# Patient Record
Sex: MALE | Race: WHITE | NOT HISPANIC OR LATINO | Employment: FULL TIME | RURAL
[De-identification: names, ages, dates, MRNs, and addresses within clinical notes are randomized per-mention and may not be internally consistent; named-entity substitution may affect disease eponyms.]

---

## 2017-01-02 ENCOUNTER — HOSPITAL ENCOUNTER (OUTPATIENT)
Dept: PHYSICAL THERAPY | Facility: HOSPITAL | Age: 27
Setting detail: RECURRING SERIES
Discharge: HOME | End: 2017-02-17
Attending: NEUROLOGICAL SURGERY | Admitting: NEUROLOGICAL SURGERY

## 2020-05-28 ENCOUNTER — OFFICE VISIT (OUTPATIENT)
Dept: FAMILY MEDICINE CLINIC | Facility: CLINIC | Age: 30
End: 2020-05-28

## 2020-05-28 VITALS
HEIGHT: 75 IN | OXYGEN SATURATION: 97 % | BODY MASS INDEX: 39.17 KG/M2 | RESPIRATION RATE: 18 BRPM | WEIGHT: 315 LBS | DIASTOLIC BLOOD PRESSURE: 83 MMHG | SYSTOLIC BLOOD PRESSURE: 130 MMHG | HEART RATE: 64 BPM | TEMPERATURE: 97.6 F

## 2020-05-28 DIAGNOSIS — R53.83 LETHARGY: ICD-10-CM

## 2020-05-28 DIAGNOSIS — B07.0 PLANTAR WART OF BOTH FEET: Primary | ICD-10-CM

## 2020-05-28 DIAGNOSIS — R06.83 SNORING: ICD-10-CM

## 2020-05-28 DIAGNOSIS — G47.9 SLEEP DISORDER: ICD-10-CM

## 2020-05-28 DIAGNOSIS — M77.11 LATERAL EPICONDYLITIS OF BOTH ELBOWS: ICD-10-CM

## 2020-05-28 DIAGNOSIS — E78.2 MIXED HYPERLIPIDEMIA: ICD-10-CM

## 2020-05-28 DIAGNOSIS — R74.8 ABNORMAL LIVER ENZYMES: ICD-10-CM

## 2020-05-28 DIAGNOSIS — M77.12 LATERAL EPICONDYLITIS OF BOTH ELBOWS: ICD-10-CM

## 2020-05-28 DIAGNOSIS — F43.9 SITUATIONAL STRESS: ICD-10-CM

## 2020-05-28 PROBLEM — R79.89 ELEVATED SERUM CREATININE: Status: ACTIVE | Noted: 2020-05-28

## 2020-05-28 PROCEDURE — 99214 OFFICE O/P EST MOD 30 MIN: CPT | Performed by: FAMILY MEDICINE

## 2020-05-28 NOTE — ASSESSMENT & PLAN NOTE
New dx.  1 of the Right distal 1st metatarsal phalangeal head.  1 of the left 1st metatarsal phalangeal head.  Discussed different treatments and referral to dermatologist.  Pt. Declines and prefers to give it some time.

## 2020-05-28 NOTE — PROGRESS NOTES
Subjective   Bobby Rodriguez is a 29 y.o. male.     Pt. Is having difficulty with sleeping he stated that he goes to bed at 11:00 pm, pt. Wakes up every hour to every hour and a half.  Pt. Stated that he does snores and is very tired during the day, he thinks he possibly has sleep apnea    Arm Pain    The incident occurred more than 1 week ago. There was no injury mechanism. The quality of the pain is described as aching. The pain radiates to the left arm and right arm (elbows). The pain is at a severity of 5/10. The pain is moderate. The pain has been constant since the incident. The symptoms are aggravated by movement and lifting. He has tried nothing for the symptoms. The treatment provided no relief.   Skin Problem   This is a new problem. The current episode started more than 1 month ago. The problem occurs constantly. The problem has been gradually worsening. Associated symptoms include arthralgias. Nothing aggravates the symptoms. He has tried nothing for the symptoms. The treatment provided no relief.        The following portions of the patient's history were reviewed and updated as appropriate: allergies, current medications, past family history, past medical history, past social history, past surgical history and problem list.    History reviewed. No pertinent family history.    Social History     Tobacco Use   • Smoking status: Never Smoker   • Smokeless tobacco: Current User     Types: Chew   Substance Use Topics   • Alcohol use: Yes     Frequency: 2-3 times a week     Drinks per session: 10 or more   • Drug use: Never       History reviewed. No pertinent surgical history.    Patient Active Problem List   Diagnosis   • Plantar wart of both feet   • Lateral epicondylitis of both elbows   • Snoring   • Situational stress   • Sleep disorder   • Mixed hyperlipidemia   • Lethargy   • Elevated serum creatinine   • Abnormal liver enzymes       No current outpatient medications on file prior to visit.     No  "current facility-administered medications on file prior to visit.        No Known Allergies    Review of Systems   Musculoskeletal: Positive for arthralgias.   Skin: Positive for skin lesions.   Psychiatric/Behavioral: Positive for sleep disturbance.       Objective   Visit Vitals  /83 (BP Location: Right arm, Patient Position: Sitting, Cuff Size: Large Adult)   Pulse 64   Temp 97.6 °F (36.4 °C) (Oral)   Resp 18   Ht 190.5 cm (75\")   Wt (!) 160 kg (352 lb 12.8 oz)   SpO2 97%   BMI 44.10 kg/m²     Physical Exam   Constitutional: He is oriented to person, place, and time. He appears well-developed and well-nourished. He is cooperative.   HENT:   Head: Normocephalic.   Neck: Trachea normal. Neck supple. Carotid bruit is not present. No thyromegaly present.   Cardiovascular: Normal rate and regular rhythm. Exam reveals no gallop and no friction rub.   No murmur heard.  Pulmonary/Chest: Effort normal and breath sounds normal. No respiratory distress. He has no wheezes. He exhibits no tenderness.   Musculoskeletal:        Left forearm: He exhibits tenderness ( lateral epicondyles bilaterally).        Neurological: He is alert and oriented to person, place, and time.   Skin: Skin is dry. No rash noted. Nails show no clubbing.   Nursing note and vitals reviewed.        Assessment/Plan .  Problem List Items Addressed This Visit        Medium    Abnormal liver enzymes    Overview     Improving, not at goal Labs done 8-4-16, read by me, reviewed with Pt on last visit.  He has decreased ETOH since then         Relevant Orders    Comprehensive metabolic panel    Mixed hyperlipidemia    Overview     Labs ordered for October         Relevant Orders    Lipid Panel w/ Chol/HDL Ratio       Low    Lateral epicondylitis of both elbows    Current Assessment & Plan     New dx.  Pt. Advised to try a lasta strap and rice exercises.            Unprioritized    Lethargy    Overview     Worsening-Scheduled appt for labs and a follow " up.         Current Assessment & Plan     He has been doing cross training which helps         Relevant Orders    CBC w AUTO Differential    TSH    Plantar wart of both feet - Primary    Current Assessment & Plan     New dx.  1 of the Right distal 1st metatarsal phalangeal head.  1 of the left 1st metatarsal phalangeal head.  Discussed different treatments and referral to dermatologist.  Pt. Declines and prefers to give it some time.         Situational stress    Current Assessment & Plan     Slightly worse.  Discussed Yoga, exercise and counseling to try different ways to relax.         Sleep disorder    Current Assessment & Plan     Worse.  Advised to follow with sleep doctor 2nd to sleep walking and possible restless legs.         Snoring

## 2020-06-03 ENCOUNTER — OFFICE VISIT (OUTPATIENT)
Dept: NEUROLOGY | Facility: CLINIC | Age: 30
End: 2020-06-03

## 2020-06-03 VITALS
BODY MASS INDEX: 39.17 KG/M2 | HEIGHT: 75 IN | WEIGHT: 315 LBS | HEART RATE: 59 BPM | TEMPERATURE: 98.2 F | DIASTOLIC BLOOD PRESSURE: 82 MMHG | SYSTOLIC BLOOD PRESSURE: 123 MMHG

## 2020-06-03 DIAGNOSIS — E66.01 MORBID OBESITY (HCC): ICD-10-CM

## 2020-06-03 DIAGNOSIS — G47.33 OBSTRUCTIVE SLEEP APNEA: Primary | ICD-10-CM

## 2020-06-03 DIAGNOSIS — F51.3 SLEEP WALKING: ICD-10-CM

## 2020-06-03 PROBLEM — IMO0002 THORACIC OR LUMBOSACRAL NEURITIS OR RADICULITIS: Status: ACTIVE | Noted: 2020-06-03

## 2020-06-03 PROBLEM — F41.9 ANXIETY: Status: ACTIVE | Noted: 2020-06-03

## 2020-06-03 PROBLEM — D75.1 POLYCYTHEMIA: Status: ACTIVE | Noted: 2020-06-03

## 2020-06-03 PROBLEM — H66.92 LEFT OTITIS MEDIA: Status: ACTIVE | Noted: 2020-06-03

## 2020-06-03 PROBLEM — J02.9 ACUTE PHARYNGITIS: Status: ACTIVE | Noted: 2020-06-03

## 2020-06-03 PROBLEM — M25.511: Status: ACTIVE | Noted: 2020-06-03

## 2020-06-03 PROBLEM — E66.3 OVERWEIGHT (BMI 25.0-29.9): Status: ACTIVE | Noted: 2020-06-03

## 2020-06-03 PROBLEM — M79.604 RIGHT LEG PAIN: Status: ACTIVE | Noted: 2020-06-03

## 2020-06-03 PROBLEM — M51.26 DISPLACEMENT OF LUMBAR INTERVERTEBRAL DISC WITHOUT MYELOPATHY: Status: ACTIVE | Noted: 2020-06-03

## 2020-06-03 PROBLEM — S86.912A STRAIN OF LEFT KNEE: Status: ACTIVE | Noted: 2020-06-03

## 2020-06-03 PROBLEM — M54.50 ACUTE LOW BACK PAIN WITHOUT SCIATICA: Status: ACTIVE | Noted: 2020-06-03

## 2020-06-03 PROBLEM — J06.9 ACUTE UPPER RESPIRATORY INFECTION: Status: ACTIVE | Noted: 2020-06-03

## 2020-06-03 PROBLEM — Z13.29 SCREENING FOR HYPOTHYROIDISM: Status: ACTIVE | Noted: 2020-06-03

## 2020-06-03 PROBLEM — M62.830 SPASM OF LUMBAR PARASPINOUS MUSCLE: Status: ACTIVE | Noted: 2020-06-03

## 2020-06-03 PROBLEM — R19.7 DIARRHEA: Status: ACTIVE | Noted: 2020-06-03

## 2020-06-03 PROBLEM — M51.46: Status: ACTIVE | Noted: 2020-06-03

## 2020-06-03 PROCEDURE — 99244 OFF/OP CNSLTJ NEW/EST MOD 40: CPT | Performed by: PSYCHIATRY & NEUROLOGY

## 2020-06-03 RX ORDER — CYCLOBENZAPRINE HCL 10 MG
TABLET ORAL 3 TIMES DAILY
COMMUNITY
Start: 2019-03-04 | End: 2021-03-01 | Stop reason: SDUPTHER

## 2020-06-03 RX ORDER — IBUPROFEN 800 MG/1
TABLET ORAL
COMMUNITY
Start: 2019-03-04 | End: 2021-04-29

## 2020-06-03 NOTE — PROGRESS NOTES
Sleep Medicine initial Consultation    Bobby Rodriguez  : 1990  29 y.o. male   Date of Service: 6/3/2020  Referring provider: Genaro Nielsen MD    New sleep, neck measures 21 1/2 inches      History Of Present Illness:   Mr. Bobby Rodriguez  is a 29 y.o. right handed  male patient has a H/O NONE is here for the evaluation of Snoring, Excessive Daytime Sleepiness, Chronic Fatigue, Insomnia and Sleep Walking.     Patient c/o snoring and sleep walking, tosses and turns through out the night frequent waking up.   Patient father has sleep apnea and currently uses a pap machine.   Morbid obesity, weight gain of 40 pounds in the past few years.     The patient c/o daytime sleepiness issues:  excessive daytime sleepiness, , chronic fatigue and There is no H/O sleep paralysis, hypnagogic hallucinations or cataplexy..      There is no history of hypnagogic hallucinations, sleep paralysis or cataplexy.    The patient complains of snoring loud in all sleeping positions and has gained 40 lbs of weight the the last 5 years.    The patient complains of problems with insomnia:  difficulty staying asleep, frequent awakenings 3, has restless sleep, Does not feel rested even after a long sleep and Still feels sleepy even when increasing sleep time.    The patient reports history of these childhood sleep problems: sleepwalking and frequent nightmares    Sleep schedule: Bedtime:10:30 , gets out of bed at 6, sleep latency: 30 minutes, Gets about 6 hours of sleep.      EPWORTH SLEEPINESS SCALE  Sitting and reading  0  WatchingTV  3  Sitting, inactive, in a public place  0  As a passenger in a car for 1 hour w/o a break  0  Lying down to rest in the afternoon  3  Sitting and talking to someone  3  Sitting quietly after a lunch  3  In a car, while stopped for traffic or a light  0  Total 12        .  History reviewed. No pertinent past medical history.  History reviewed. No pertinent surgical history.  Current  Outpatient Medications on File Prior to Visit   Medication Sig Dispense Refill   • cyclobenzaprine (FLEXERIL) 10 MG tablet Take  by mouth 3 (Three) Times a Day.     • ibuprofen (ADVIL,MOTRIN) 800 MG tablet Take  by mouth.       No current facility-administered medications on file prior to visit.      No Known Allergies  History reviewed. No pertinent family history.  Social History     Socioeconomic History   • Marital status: Single     Spouse name: Not on file   • Number of children: Not on file   • Years of education: Not on file   • Highest education level: Not on file   Tobacco Use   • Smoking status: Never Smoker   • Smokeless tobacco: Current User     Types: Chew   Substance and Sexual Activity   • Alcohol use: Yes     Frequency: 2-3 times a week     Drinks per session: 10 or more   • Drug use: Never   • Sexual activity: Yes     Partners: Female     Review of Systems   Constitutional: Positive for fatigue. Negative for appetite change.   HENT: Negative for postnasal drip, sinus pressure and sinus pain.    Eyes: Negative for pain and itching.   Respiratory: Negative for cough and shortness of breath.    Cardiovascular: Negative for chest pain and palpitations.   Gastrointestinal: Negative for constipation and diarrhea.   Endocrine: Negative for cold intolerance and heat intolerance.   Genitourinary: Negative for difficulty urinating and frequency.   Musculoskeletal: Positive for back pain. Negative for neck pain.   Allergic/Immunologic: Negative for environmental allergies.   Neurological: Negative for dizziness, tremors, seizures, syncope, facial asymmetry, speech difficulty, weakness, light-headedness, numbness and headaches.   Psychiatric/Behavioral: Positive for sleep disturbance. Negative for agitation and confusion.       I reviewed and addressed ROS entered by MA.    Patient examination:  Vitals:    06/03/20 1726   BP: 123/82   Pulse: 59   Temp: 98.2 °F (36.8 °C)    Body mass index is 43.27 kg/m².      Physical Exam   Constitutional: He is oriented to person, place, and time. He appears well-developed and well-nourished.   HENT:   Nose: Nose normal.   Mouth/Throat: Oropharynx is clear and moist.   Eyes: Pupils are equal, round, and reactive to light. Conjunctivae and EOM are normal.   Cardiovascular: Normal rate, regular rhythm and normal heart sounds.   Pulmonary/Chest: Effort normal and breath sounds normal. No respiratory distress.   Musculoskeletal: Normal range of motion. He exhibits no edema or deformity.   Neurological: He is alert and oriented to person, place, and time. No cranial nerve deficit.   Psychiatric: He has a normal mood and affect. His behavior is normal.   Vitals reviewed.      ASSESSMENT AND PLAN:    The patient has symptoms of obstructive sleep apnea syndrome with hypersomnia. We will proceed with polysomnography and treat with CPAP therapy if needed.     Encounter Diagnoses   Name Primary?   • Obstructive sleep apnea Yes   • Morbid obesity (CMS/HCC)    • Sleep walking        Orders Placed This Encounter   Procedures   • Polysomnography 4 or More Parameters       Return in about 3 months (around 9/3/2020) for Recheck.    This document has been electronically signed by Joseph Seipel, MD  on Daniela 3, 2020 18:05

## 2020-06-09 ENCOUNTER — HOSPITAL ENCOUNTER (OUTPATIENT)
Dept: SLEEP MEDICINE | Facility: HOSPITAL | Age: 30
Discharge: HOME | End: 2020-06-09
Admitting: PSYCHIATRY & NEUROLOGY

## 2020-06-09 VITALS — HEIGHT: 75 IN | WEIGHT: 315 LBS | BODY MASS INDEX: 39.17 KG/M2

## 2020-06-09 DIAGNOSIS — G47.33 OBSTRUCTIVE SLEEP APNEA: ICD-10-CM

## 2020-06-09 PROCEDURE — 95810 POLYSOM 6/> YRS 4/> PARAM: CPT

## 2020-06-10 PROCEDURE — 95810 POLYSOM 6/> YRS 4/> PARAM: CPT | Performed by: PSYCHIATRY & NEUROLOGY

## 2020-06-11 ENCOUNTER — TELEPHONE (OUTPATIENT)
Dept: NEUROLOGY | Facility: CLINIC | Age: 30
End: 2020-06-11

## 2020-06-11 DIAGNOSIS — G47.33 OBSTRUCTIVE SLEEP APNEA: Primary | ICD-10-CM

## 2020-06-18 ENCOUNTER — RESULTS ENCOUNTER (OUTPATIENT)
Dept: FAMILY MEDICINE CLINIC | Facility: CLINIC | Age: 30
End: 2020-06-18

## 2020-06-18 DIAGNOSIS — R74.8 ABNORMAL LIVER ENZYMES: ICD-10-CM

## 2020-06-18 DIAGNOSIS — R53.83 LETHARGY: ICD-10-CM

## 2020-06-18 DIAGNOSIS — E78.2 MIXED HYPERLIPIDEMIA: ICD-10-CM

## 2020-06-19 LAB
ALBUMIN SERPL-MCNC: 4.4 G/DL (ref 4.1–5.2)
ALBUMIN/GLOB SERPL: 1.8 {RATIO} (ref 1.2–2.2)
ALP SERPL-CCNC: 75 IU/L (ref 39–117)
ALT SERPL-CCNC: 68 IU/L (ref 0–44)
AST SERPL-CCNC: 38 IU/L (ref 0–40)
BASOPHILS # BLD AUTO: 0.1 X10E3/UL (ref 0–0.2)
BASOPHILS NFR BLD AUTO: 1 %
BILIRUB SERPL-MCNC: 0.4 MG/DL (ref 0–1.2)
BUN SERPL-MCNC: 19 MG/DL (ref 6–20)
BUN/CREAT SERPL: 17 (ref 9–20)
CALCIUM SERPL-MCNC: 9 MG/DL (ref 8.7–10.2)
CHLORIDE SERPL-SCNC: 102 MMOL/L (ref 96–106)
CHOLEST SERPL-MCNC: 178 MG/DL (ref 100–199)
CHOLEST/HDLC SERPL: 4.9 RATIO (ref 0–5)
CO2 SERPL-SCNC: 26 MMOL/L (ref 20–29)
CREAT SERPL-MCNC: 1.13 MG/DL (ref 0.76–1.27)
EOSINOPHIL # BLD AUTO: 0.2 X10E3/UL (ref 0–0.4)
EOSINOPHIL NFR BLD AUTO: 2 %
ERYTHROCYTE [DISTWIDTH] IN BLOOD BY AUTOMATED COUNT: 13.4 % (ref 11.6–15.4)
GLOBULIN SER CALC-MCNC: 2.4 G/DL (ref 1.5–4.5)
GLUCOSE SERPL-MCNC: 106 MG/DL (ref 65–99)
HCT VFR BLD AUTO: 46.8 % (ref 37.5–51)
HDLC SERPL-MCNC: 36 MG/DL
HGB BLD-MCNC: 15.5 G/DL (ref 13–17.7)
IMM GRANULOCYTES # BLD AUTO: 0.1 X10E3/UL (ref 0–0.1)
IMM GRANULOCYTES NFR BLD AUTO: 1 %
LDLC SERPL CALC-MCNC: 108 MG/DL (ref 0–99)
LYMPHOCYTES # BLD AUTO: 2.2 X10E3/UL (ref 0.7–3.1)
LYMPHOCYTES NFR BLD AUTO: 29 %
MCH RBC QN AUTO: 30.2 PG (ref 26.6–33)
MCHC RBC AUTO-ENTMCNC: 33.1 G/DL (ref 31.5–35.7)
MCV RBC AUTO: 91 FL (ref 79–97)
MONOCYTES # BLD AUTO: 0.7 X10E3/UL (ref 0.1–0.9)
MONOCYTES NFR BLD AUTO: 9 %
NEUTROPHILS # BLD AUTO: 4.5 X10E3/UL (ref 1.4–7)
NEUTROPHILS NFR BLD AUTO: 58 %
PLATELET # BLD AUTO: 219 X10E3/UL (ref 150–450)
POTASSIUM SERPL-SCNC: 5.3 MMOL/L (ref 3.5–5.2)
PROT SERPL-MCNC: 6.8 G/DL (ref 6–8.5)
RBC # BLD AUTO: 5.14 X10E6/UL (ref 4.14–5.8)
SODIUM SERPL-SCNC: 140 MMOL/L (ref 134–144)
TRIGL SERPL-MCNC: 172 MG/DL (ref 0–149)
TSH SERPL DL<=0.005 MIU/L-ACNC: 2.47 UIU/ML (ref 0.45–4.5)
VLDLC SERPL CALC-MCNC: 34 MG/DL (ref 5–40)
WBC # BLD AUTO: 7.7 X10E3/UL (ref 3.4–10.8)

## 2020-06-25 ENCOUNTER — OFFICE VISIT (OUTPATIENT)
Dept: FAMILY MEDICINE CLINIC | Facility: CLINIC | Age: 30
End: 2020-06-25

## 2020-06-25 VITALS
WEIGHT: 315 LBS | HEIGHT: 75 IN | OXYGEN SATURATION: 98 % | HEART RATE: 78 BPM | DIASTOLIC BLOOD PRESSURE: 80 MMHG | BODY MASS INDEX: 39.17 KG/M2 | SYSTOLIC BLOOD PRESSURE: 127 MMHG | RESPIRATION RATE: 18 BRPM | TEMPERATURE: 97.9 F

## 2020-06-25 DIAGNOSIS — F43.9 SITUATIONAL STRESS: ICD-10-CM

## 2020-06-25 DIAGNOSIS — E78.2 MIXED HYPERLIPIDEMIA: ICD-10-CM

## 2020-06-25 DIAGNOSIS — D75.1 POLYCYTHEMIA: ICD-10-CM

## 2020-06-25 DIAGNOSIS — E87.5 HYPERKALEMIA: ICD-10-CM

## 2020-06-25 DIAGNOSIS — R74.8 ABNORMAL LIVER ENZYMES: ICD-10-CM

## 2020-06-25 DIAGNOSIS — E66.01 MORBID OBESITY (HCC): Primary | ICD-10-CM

## 2020-06-25 DIAGNOSIS — M79.604 RIGHT LEG PAIN: ICD-10-CM

## 2020-06-25 DIAGNOSIS — B07.0 PLANTAR WART OF BOTH FEET: ICD-10-CM

## 2020-06-25 DIAGNOSIS — Z00.00 ANNUAL PHYSICAL EXAM: ICD-10-CM

## 2020-06-25 DIAGNOSIS — R53.83 LETHARGY: ICD-10-CM

## 2020-06-25 DIAGNOSIS — M51.26 DISPLACEMENT OF LUMBAR INTERVERTEBRAL DISC WITHOUT MYELOPATHY: ICD-10-CM

## 2020-06-25 DIAGNOSIS — R06.83 SNORING: ICD-10-CM

## 2020-06-25 DIAGNOSIS — R79.89 ELEVATED SERUM CREATININE: ICD-10-CM

## 2020-06-25 DIAGNOSIS — F51.3 SLEEP WALKING: ICD-10-CM

## 2020-06-25 DIAGNOSIS — R73.9 HYPERGLYCEMIA: ICD-10-CM

## 2020-06-25 DIAGNOSIS — M77.01 BILATERAL MEDIAL EPICONDYLITIS OF ELBOW JOINT: ICD-10-CM

## 2020-06-25 DIAGNOSIS — M77.02 BILATERAL MEDIAL EPICONDYLITIS OF ELBOW JOINT: ICD-10-CM

## 2020-06-25 DIAGNOSIS — F41.9 ANXIETY: ICD-10-CM

## 2020-06-25 DIAGNOSIS — M51.46: ICD-10-CM

## 2020-06-25 PROCEDURE — 99214 OFFICE O/P EST MOD 30 MIN: CPT | Performed by: FAMILY MEDICINE

## 2020-06-25 PROCEDURE — 99395 PREV VISIT EST AGE 18-39: CPT | Performed by: FAMILY MEDICINE

## 2020-06-25 RX ORDER — CITALOPRAM 10 MG/1
10 TABLET ORAL DAILY
Qty: 30 TABLET | Refills: 5 | Status: SHIPPED | OUTPATIENT
Start: 2020-06-25 | End: 2020-09-29 | Stop reason: SDUPTHER

## 2020-06-25 NOTE — ASSESSMENT & PLAN NOTE
Left worse than right.  Pt. Will try RICE exercises and advised to continue wearing the lasta strap

## 2020-06-25 NOTE — ASSESSMENT & PLAN NOTE
New dx.  Labs done 6-, read by me, reviewed with pt.  Potassium was 5.3 up from 4.1.  Will repeat with next labs

## 2020-06-25 NOTE — PROGRESS NOTES
Subjective   Bobby Rodriguez is a 29 y.o. male here for his annual physical with me. Bobby is here for coordination of medical care, to discuss health maintenance, disease prevention as well as to followup on medical problems. Patient has been followed by me since approx 2004. Patient's last CPE was 8-. Activity level is moderate. Exercises 2 per week. Appetite is good. Feels well with few complaints. Energy level is good. Sleeps poorly. Patient's last colonoscopy was never. He is advised to have colonoscopy at age 52. Patient is doing routine self skin exam never. Patient is doing routine self-breast exams never. Patient is checking testicles never.    No results found for: PSA  Due to age-    Obesity   This is a chronic problem. The current episode started more than 1 year ago. The problem occurs constantly. The problem has been gradually worsening. Associated symptoms include arthralgias (left elbow pain). Pertinent negatives include no abdominal pain, chest pain, chills, congestion, coughing, fatigue, fever, joint swelling, myalgias, nausea, neck pain, rash, sore throat, vomiting or weakness. Nothing aggravates the symptoms. He has tried nothing for the symptoms.   Abnormal Lab   This is a new problem. The current episode started in the past 7 days. Associated symptoms include arthralgias (left elbow pain). Pertinent negatives include no abdominal pain, chest pain, chills, congestion, coughing, fatigue, fever, joint swelling, myalgias, nausea, neck pain, rash, sore throat, vomiting or weakness. Nothing aggravates the symptoms. He has tried nothing for the symptoms.   Hyperlipidemia   This is a chronic problem. The current episode started more than 1 year ago. Recent lipid tests were reviewed and are high. Exacerbating diseases include obesity. Factors aggravating his hyperlipidemia include fatty foods. Pertinent negatives include no chest pain, myalgias or shortness of breath. He is currently on no  antihyperlipidemic treatment. The current treatment provides no improvement of lipids. There are no compliance problems.  Risk factors for coronary artery disease include dyslipidemia and obesity.   Snoring   This is a chronic problem. The current episode started more than 1 year ago. The problem occurs constantly. The problem has been unchanged. Associated symptoms include arthralgias (left elbow pain). Pertinent negatives include no abdominal pain, chest pain, chills, congestion, coughing, fatigue, fever, joint swelling, myalgias, nausea, neck pain, rash, sore throat, vomiting or weakness. Nothing aggravates the symptoms. He has tried nothing for the symptoms. The treatment provided no relief.   Arm Pain    There was no injury mechanism. The pain is present in the left elbow. The pain is at a severity of 4/10. The pain is mild. The pain has been intermittent since the incident. Pertinent negatives include no chest pain. The treatment provided mild relief.        The following portions of the patient's history were reviewed and updated as appropriate: allergies, current medications, past family history, past medical history, past social history, past surgical history and problem list.    Past Medical History:   Diagnosis Date   • Hyperlipidemia        Family History   Problem Relation Age of Onset   • Diabetes Father    • Hyperlipidemia Father    • Hypertension Father    • Cancer Maternal Grandmother         Pancreatic at 82   • Heart disease Maternal Grandfather    • Cancer Paternal Grandfather         Pancreatic at 73       Social History     Socioeconomic History   • Marital status: Single     Spouse name: Not on file   • Number of children: Not on file   • Years of education: Not on file   • Highest education level: Not on file   Tobacco Use   • Smoking status: Never Smoker   • Smokeless tobacco: Current User     Types: Chew   Substance and Sexual Activity   • Alcohol use: Yes     Frequency: 2-3 times a week      Drinks per session: 10 or more   • Drug use: Never   • Sexual activity: Yes     Partners: Female     Birth control/protection: Condom, Pill   Social History Narrative    Never , lives alone, dating for the last 2 months is sexually active and uses condoms and birth control pills.  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  Exercise 2 x weekly cross fit 1 hour.  Caffeine 1/2 gallon tea daily.  Wears seatbelts 30% of the time.  Hobbies boating, yard work and outdoors.        Past Surgical History:   Procedure Laterality Date   • EXCISION MASS LEG  2009    [facility] at [facility]    • SPINE SURGERY  2013    Lumbar   • TONSILLECTOMY         Current Outpatient Medications on File Prior to Visit   Medication Sig Dispense Refill   • ibuprofen (ADVIL,MOTRIN) 800 MG tablet Take  by mouth.     • cyclobenzaprine (FLEXERIL) 10 MG tablet Take  by mouth 3 (Three) Times a Day.       No current facility-administered medications on file prior to visit.        No Known Allergies    Review of Systems   Constitutional: Negative for appetite change, chills, fatigue, fever, unexpected weight gain and unexpected weight loss.   HENT: Negative for congestion, dental problem, ear discharge, ear pain, hearing loss, nosebleeds, postnasal drip, rhinorrhea, sinus pressure, sneezing, sore throat, tinnitus and voice change.         Last Dental exam 6-2020, DR. Jc-doing well   Eyes: Negative for blurred vision, double vision, pain, redness and visual disturbance.        Last vision exam  at  [facility]-doing well   Respiratory: Positive for snoring. Negative for cough, shortness of breath, wheezing and stridor.    Cardiovascular: Negative for chest pain, palpitations and leg swelling.   Gastrointestinal: Negative for abdominal pain, anal bleeding, blood in stool, constipation, diarrhea, nausea, rectal pain, vomiting, GERD and indigestion.        7 BM weekly   Endocrine: Negative for cold intolerance, heat  "intolerance, polydipsia, polyphagia and polyuria.   Genitourinary: Negative for difficulty urinating, dysuria, frequency, hematuria and urgency.   Musculoskeletal: Positive for arthralgias (left elbow pain). Negative for back pain, joint swelling, myalgias, neck pain and neck stiffness.   Skin: Negative for color change, dry skin and rash.   Neurological: Negative for dizziness, syncope, speech difficulty, weakness, light-headedness, headache and memory problem.   Hematological: Does not bruise/bleed easily.   Psychiatric/Behavioral: Positive for sleep disturbance (snoring). Negative for decreased concentration, depressed mood and stress. The patient is not nervous/anxious.        Objective   Visit Vitals  /80 (BP Location: Right arm, Patient Position: Sitting, Cuff Size: Large Adult)   Pulse 78   Temp 97.9 °F (36.6 °C) (Oral)   Resp 18   Ht 190.5 cm (75\")   Wt (!) 155 kg (341 lb 6.4 oz)   SpO2 98%   BMI 42.67 kg/m²     Physical Exam   Constitutional: He is oriented to person, place, and time. He appears well-developed and well-nourished. No distress. He is obese.  HENT:   Head: Normocephalic.   Right Ear: Hearing, external ear and ear canal normal. Tympanic membrane is injected.   Left Ear: Hearing, external ear and ear canal normal. Tympanic membrane is injected.   Mouth/Throat: Oropharynx is clear and moist. No oropharyngeal exudate.   Eyes: Pupils are equal, round, and reactive to light. Conjunctivae, EOM and lids are normal. Right eye exhibits no discharge. Left eye exhibits no discharge. No scleral icterus.   Neck: Trachea normal, normal range of motion and full passive range of motion without pain. Neck supple.   Cardiovascular: Normal rate, regular rhythm, normal heart sounds and intact distal pulses.   No murmur heard.  Pulmonary/Chest: Effort normal and breath sounds normal. He has no wheezes. He exhibits no tenderness.   Abdominal: Soft. Bowel sounds are normal. He exhibits no distension and no " mass. There is no tenderness. No hernia.   Genitourinary: Rectum normal, prostate normal, testes normal and penis normal. Circumcised. No penile tenderness.   Musculoskeletal: Normal range of motion. He exhibits no edema, tenderness or deformity.   Lymphadenopathy:     He has no cervical adenopathy.   Neurological: He is alert and oriented to person, place, and time. He has normal strength. He displays normal reflexes. No cranial nerve deficit or sensory deficit. He exhibits normal muscle tone. He displays a negative Romberg sign. Coordination normal.   Skin: Skin is warm and dry. No rash noted. He is not diaphoretic. No erythema.   Planter warts bilateral feet.  Bilateral axillae skin tags.   Psychiatric: He has a normal mood and affect. His behavior is normal. Judgment and thought content normal.       Assessment/Plan   Problem List Items Addressed This Visit        Medium    Abnormal liver enzymes    Current Assessment & Plan     Slightly worse.  Labs done 6-, read by me, reviewed with pt.  AST was 38 down from 47, ALT was 68.  Bilirubin was 0.4 down from 1.3  Patient encouraged to decrease alcohol intake         Elevated serum creatinine    Current Assessment & Plan     Resolved.  Labs done 6-, read by me, reviewed with pt.  Creatinine was 1.13 down from 1.4         Mixed hyperlipidemia    Current Assessment & Plan     Labs done 6-, read by me, reviewed with pt.  Trig. 172 up from 111, Tot. Chol. 178 down from 190, HDL 36 down from 42,  down from 126.  Slightly worse.  Encouraged to watch fatty intake, exercise more, and lose weight.   No medication   Is not getting adequate diet and exercise  Goals developed at last visit were not met because diet and exercise  Follow up in 3 months  Care management needs are self-addressed.Self-management abilities addressed and patient is capable of managing his own disease.           Relevant Orders    Lipid Panel w/ Chol/HDL Ratio     Comprehensive Metabolic Panel       Low    Anxiety    Current Assessment & Plan     Stable, start Celexa 10 mg daily         Relevant Medications    citalopram (CeleXA) 10 MG tablet    Bilateral medial epicondylitis of elbow joint    Current Assessment & Plan     Left worse than right.  Pt. Will try RICE exercises and advised to continue wearing the lasta strap         Morbid obesity (CMS/HCC) - Primary    Current Assessment & Plan     Worse, pt. Gained 5 Lbs         Polycythemia    Current Assessment & Plan     Resolved.  Labs done 6-, read by me, reviewed with pt.  CBC was normal            Unprioritized    Annual physical exam    Overview     Medical records thoroughly reviewed and summarized in emr, since last PE             Current Assessment & Plan     Encouraged to do self-breast exam, self-testicle exams, and self derm exams. Congratulated on using seat belts.  Encouraged to do annual physical exams.  Immunization status reviewed.  Unable to give T-dap due to being on back order.           Displacement of lumbar intervertebral disc without myelopathy    Current Assessment & Plan     Stable, intermittent and moderate         Hyperglycemia    Current Assessment & Plan     New dx.  Labs done 6-, read by me, reviewed with pt.   Glucose was 106.    Encourged to watch sugar intake, exercise more, lose weight,            Relevant Orders    Hemoglobin A1c    Hyperkalemia    Current Assessment & Plan     New dx.  Labs done 6-, read by me, reviewed with pt.  Potassium was 5.3 up from 4.1.  Will repeat with next labs         Lethargy    Current Assessment & Plan     Mild         Plantar wart of both feet    Current Assessment & Plan     Worse, pt. Declines treatment         Right leg pain    Current Assessment & Plan     Resolved         Schmorl's nodes-lumbar region    Current Assessment & Plan     Stable         Situational stress    Current Assessment & Plan     Improved         Sleep walking     Current Assessment & Plan     Discussed this should improve with use of C-Pap. Recent evaluation by [facility] Snoring    Current Assessment & Plan     Pt. Will start sleep device in July                    Encouraged to check his skin, testicles and breasts monthly. Reviewed exercising regularly, eating a balanced diet, having regular  immunizations and if due, patient counselled to check with insurance company for coverage;, importance of  regularly checking skin and breasts.

## 2020-06-25 NOTE — ASSESSMENT & PLAN NOTE
Encouraged to do self-breast exam, self-testicle exams, and self derm exams. Congratulated on using seat belts.  Encouraged to do annual physical exams.  Immunization status reviewed.  Unable to give T-dap due to being on back order.

## 2020-06-25 NOTE — ASSESSMENT & PLAN NOTE
Labs done 6-, read by me, reviewed with pt.  Trig. 172 up from 111, Tot. Chol. 178 down from 190, HDL 36 down from 42,  down from 126.  Slightly worse.  Encouraged to watch fatty intake, exercise more, and lose weight.   No medication   Is not getting adequate diet and exercise  Goals developed at last visit were not met because diet and exercise  Follow up in 3 months  Care management needs are self-addressed.Self-management abilities addressed and patient is capable of managing his own disease.

## 2020-06-25 NOTE — ASSESSMENT & PLAN NOTE
Slightly worse.  Labs done 6-, read by me, reviewed with pt.  AST was 38 down from 47, ALT was 68.  Bilirubin was 0.4 down from 1.3  Patient encouraged to decrease alcohol intake

## 2020-06-28 PROBLEM — E87.5 HYPERKALEMIA: Status: RESOLVED | Noted: 2020-06-25 | Resolved: 2020-06-28

## 2020-06-29 PROBLEM — R73.9 HYPERGLYCEMIA: Status: ACTIVE | Noted: 2020-06-29

## 2020-06-29 NOTE — ASSESSMENT & PLAN NOTE
New dx.  Labs done 6-, read by me, reviewed with pt.   Glucose was 106.    Encourged to watch sugar intake, exercise more, lose weight,

## 2020-09-22 ENCOUNTER — RESULTS ENCOUNTER (OUTPATIENT)
Dept: FAMILY MEDICINE CLINIC | Facility: CLINIC | Age: 30
End: 2020-09-22

## 2020-09-22 DIAGNOSIS — R73.9 HYPERGLYCEMIA: ICD-10-CM

## 2020-09-22 DIAGNOSIS — E78.2 MIXED HYPERLIPIDEMIA: ICD-10-CM

## 2020-09-23 LAB
ALBUMIN SERPL-MCNC: 4.8 G/DL (ref 4.1–5.2)
ALBUMIN/GLOB SERPL: 1.8 {RATIO} (ref 1.2–2.2)
ALP SERPL-CCNC: 84 IU/L (ref 39–117)
ALT SERPL-CCNC: 67 IU/L (ref 0–44)
AST SERPL-CCNC: 35 IU/L (ref 0–40)
BILIRUB SERPL-MCNC: 0.6 MG/DL (ref 0–1.2)
BUN SERPL-MCNC: 23 MG/DL (ref 6–20)
BUN/CREAT SERPL: 19 (ref 9–20)
CALCIUM SERPL-MCNC: 9.5 MG/DL (ref 8.7–10.2)
CHLORIDE SERPL-SCNC: 101 MMOL/L (ref 96–106)
CHOLEST SERPL-MCNC: 196 MG/DL (ref 100–199)
CHOLEST/HDLC SERPL: 5.9 RATIO (ref 0–5)
CO2 SERPL-SCNC: 26 MMOL/L (ref 20–29)
CREAT SERPL-MCNC: 1.21 MG/DL (ref 0.76–1.27)
GLOBULIN SER CALC-MCNC: 2.6 G/DL (ref 1.5–4.5)
GLUCOSE SERPL-MCNC: 84 MG/DL (ref 65–99)
HBA1C MFR BLD: 5.6 % (ref 4.8–5.6)
HDLC SERPL-MCNC: 33 MG/DL
LDLC SERPL CALC-MCNC: 116 MG/DL (ref 0–99)
POTASSIUM SERPL-SCNC: 5.1 MMOL/L (ref 3.5–5.2)
PROT SERPL-MCNC: 7.4 G/DL (ref 6–8.5)
SODIUM SERPL-SCNC: 139 MMOL/L (ref 134–144)
TRIGL SERPL-MCNC: 268 MG/DL (ref 0–149)
VLDLC SERPL CALC-MCNC: 47 MG/DL (ref 5–40)

## 2020-09-29 ENCOUNTER — OFFICE VISIT (OUTPATIENT)
Dept: FAMILY MEDICINE CLINIC | Facility: CLINIC | Age: 30
End: 2020-09-29

## 2020-09-29 VITALS
SYSTOLIC BLOOD PRESSURE: 123 MMHG | WEIGHT: 315 LBS | HEIGHT: 75 IN | HEART RATE: 84 BPM | OXYGEN SATURATION: 97 % | TEMPERATURE: 97.5 F | RESPIRATION RATE: 18 BRPM | BODY MASS INDEX: 39.17 KG/M2 | DIASTOLIC BLOOD PRESSURE: 82 MMHG

## 2020-09-29 DIAGNOSIS — E87.5 HYPERKALEMIA: ICD-10-CM

## 2020-09-29 DIAGNOSIS — M77.01 BILATERAL MEDIAL EPICONDYLITIS OF ELBOW JOINT: ICD-10-CM

## 2020-09-29 DIAGNOSIS — M77.02 BILATERAL MEDIAL EPICONDYLITIS OF ELBOW JOINT: ICD-10-CM

## 2020-09-29 DIAGNOSIS — F41.9 ANXIETY: ICD-10-CM

## 2020-09-29 DIAGNOSIS — R73.9 HYPERGLYCEMIA: ICD-10-CM

## 2020-09-29 DIAGNOSIS — E78.2 MIXED HYPERLIPIDEMIA: Primary | ICD-10-CM

## 2020-09-29 DIAGNOSIS — L02.91 ABSCESS: ICD-10-CM

## 2020-09-29 DIAGNOSIS — R74.8 ABNORMAL LIVER ENZYMES: ICD-10-CM

## 2020-09-29 PROCEDURE — 99214 OFFICE O/P EST MOD 30 MIN: CPT | Performed by: FAMILY MEDICINE

## 2020-09-29 RX ORDER — CEPHALEXIN 500 MG/1
500 CAPSULE ORAL 2 TIMES DAILY
Qty: 20 CAPSULE | Refills: 0 | Status: SHIPPED | OUTPATIENT
Start: 2020-09-29 | End: 2021-01-18

## 2020-09-29 RX ORDER — CITALOPRAM 10 MG/1
10 TABLET ORAL DAILY
Qty: 30 TABLET | Refills: 5 | Status: SHIPPED | OUTPATIENT
Start: 2020-09-29 | End: 2021-04-29 | Stop reason: SDUPTHER

## 2021-01-18 ENCOUNTER — OFFICE VISIT (OUTPATIENT)
Dept: FAMILY MEDICINE CLINIC | Facility: CLINIC | Age: 31
End: 2021-01-18

## 2021-01-18 VITALS
TEMPERATURE: 97.9 F | BODY MASS INDEX: 39.17 KG/M2 | HEIGHT: 75 IN | OXYGEN SATURATION: 98 % | DIASTOLIC BLOOD PRESSURE: 72 MMHG | WEIGHT: 315 LBS | RESPIRATION RATE: 16 BRPM | SYSTOLIC BLOOD PRESSURE: 148 MMHG | HEART RATE: 83 BPM

## 2021-01-18 DIAGNOSIS — E87.5 HYPERKALEMIA: ICD-10-CM

## 2021-01-18 DIAGNOSIS — R74.8 ABNORMAL LIVER ENZYMES: ICD-10-CM

## 2021-01-18 DIAGNOSIS — I83.91 VARICOSE VEINS OF RIGHT LOWER EXTREMITY, UNSPECIFIED WHETHER COMPLICATED: ICD-10-CM

## 2021-01-18 DIAGNOSIS — E78.2 MIXED HYPERLIPIDEMIA: Primary | ICD-10-CM

## 2021-01-18 DIAGNOSIS — R73.9 HYPERGLYCEMIA: ICD-10-CM

## 2021-01-18 PROBLEM — I10 HYPERTENSION: Status: ACTIVE | Noted: 2021-01-18

## 2021-01-18 PROBLEM — I83.90 VARICOSE VEIN OF LEG: Status: ACTIVE | Noted: 2021-01-18

## 2021-01-18 PROCEDURE — 99214 OFFICE O/P EST MOD 30 MIN: CPT | Performed by: FAMILY MEDICINE

## 2021-01-18 NOTE — ASSESSMENT & PLAN NOTE
Doing well; Hgb a1c 5.5 down from 5.6. Encourged to watch sugar intake, exercise more, lose weight,

## 2021-01-18 NOTE — PROGRESS NOTES
Subjective   Bobby Rodriguez is a 30 y.o. male.     Varicose Vein-right upper leg.    Hyperlipidemia  This is a chronic problem. The current episode started more than 1 year ago. The problem is controlled. Pertinent negatives include no myalgias. Current antihyperlipidemic treatment includes diet change. Risk factors for coronary artery disease include dyslipidemia.   Hypertension  This is a new problem. The current episode started more than 1 month ago. The problem has been gradually worsening since onset. The problem is controlled. Risk factors for coronary artery disease include dyslipidemia. Past treatments include nothing.        The following portions of the patient's history were reviewed and updated as appropriate: current medications, past family history, past medical history, past social history, past surgical history and problem list.    Family History   Problem Relation Age of Onset   • Diabetes Father    • Hyperlipidemia Father    • Hypertension Father    • Cancer Maternal Grandmother         Pancreatic at 82   • Heart disease Maternal Grandfather    • Cancer Paternal Grandfather         Pancreatic at 73       Social History     Tobacco Use   • Smoking status: Never Smoker   • Smokeless tobacco: Current User     Types: Chew   Substance Use Topics   • Alcohol use: Yes     Frequency: 2-3 times a week     Drinks per session: 10 or more   • Drug use: Never       Past Surgical History:   Procedure Laterality Date   • EXCISION MASS LEG  2009    [facility]M at [facility]    • SPINE SURGERY  2013    Lumbar   • TONSILLECTOMY         Patient Active Problem List   Diagnosis   • Plantar wart of both feet   • Bilateral medial epicondylitis of elbow joint   • Snoring   • Situational stress   • Mixed hyperlipidemia   • Lethargy   • Elevated serum creatinine   • Abnormal liver enzymes   • Anxiety   • Displacement of lumbar intervertebral disc without myelopathy   • Morbid obesity (CMS/HCC)   • Polycythemia   • Right leg pain   •  "Schmorl's nodes-lumbar region   • Sleep walking   • Hyperkalemia   • Annual physical exam   • Hyperglycemia   • Abscess   • Hypertension   • Varicose vein of leg       Current Outpatient Medications on File Prior to Visit   Medication Sig Dispense Refill   • citalopram (CeleXA) 10 MG tablet Take 1 tablet by mouth Daily. 30 tablet 5   • cyclobenzaprine (FLEXERIL) 10 MG tablet Take  by mouth 3 (Three) Times a Day.     • ibuprofen (ADVIL,MOTRIN) 800 MG tablet Take  by mouth.       No current facility-administered medications on file prior to visit.        No Known Allergies    Review of Systems   Constitutional: Negative for unexpected weight gain and unexpected weight loss.   Gastrointestinal: Negative for nausea.   Musculoskeletal: Negative for myalgias.   Skin: Negative for dry skin.       Objective   Visit Vitals  /72 (BP Location: Left arm, Patient Position: Sitting, Cuff Size: Large Adult)   Pulse 83   Temp 97.9 °F (36.6 °C)   Resp 16   Ht 190.5 cm (75\")   Wt (!) 152 kg (335 lb 6.4 oz)   SpO2 98%   BMI 41.92 kg/m²     Physical Exam  Vitals signs and nursing note reviewed.   Constitutional:       Appearance: He is well-developed.   HENT:      Head: Normocephalic.   Neck:      Musculoskeletal: Neck supple.      Thyroid: No thyromegaly.      Vascular: No carotid bruit.      Trachea: Trachea normal.   Cardiovascular:      Rate and Rhythm: Normal rate and regular rhythm.      Heart sounds: No murmur. No friction rub. No gallop.    Pulmonary:      Effort: Pulmonary effort is normal. No respiratory distress.      Breath sounds: Normal breath sounds. No wheezing.   Chest:      Chest wall: No tenderness.   Skin:     General: Skin is dry.      Findings: No rash.      Nails: There is no clubbing.     Neurological:      Mental Status: He is alert and oriented to person, place, and time.   Psychiatric:         Behavior: Behavior is cooperative.           Assessment/Plan .  Problem List Items Addressed This Visit        " Medium    Abnormal liver enzymes    Current Assessment & Plan     Worsening- possibly due to alcohol intake or fatty liver-patient has gained 7 lbs since last visit.          Mixed hyperlipidemia - Primary    Current Assessment & Plan     -Worsening If not improving in 3 months may need to consider starting a statin.  Encouraged to watch fatty intake, exercise more, and lose weight.   no medication    Is not getting adequate diet and exercise  Goals developed at last visit were not met   Follow up in 3  months  Care management needs are self-addressed.  Self-management abilities addressed and patient is capable of managing his own disease.              Low    Hyperglycemia    Current Assessment & Plan     Doing well; Hgb a1c 5.5 down from 5.6. Encourged to watch sugar intake, exercise more, lose weight,            Hyperkalemia    Current Assessment & Plan     Worsening; Potassium level 5.5 up from 5.1. Advised patient to avoid foods containing potassium.             Unprioritized    Varicose vein of leg    Current Assessment & Plan     Right upper leg-advised patient to elevate as much as possible, wear support hose. Watch closely for change.

## 2021-01-18 NOTE — ASSESSMENT & PLAN NOTE
-Worsening If not improving in 3 months may need to consider starting a statin.  Encouraged to watch fatty intake, exercise more, and lose weight.   no medication    Is not getting adequate diet and exercise  Goals developed at last visit were not met   Follow up in 3  months  Care management needs are self-addressed.  Self-management abilities addressed and patient is capable of managing his own disease.

## 2021-01-18 NOTE — ASSESSMENT & PLAN NOTE
Worsening- possibly due to alcohol intake or fatty liver-patient has gained 7 lbs since last visit.

## 2021-01-18 NOTE — ASSESSMENT & PLAN NOTE
Right upper leg-advised patient to elevate as much as possible, wear support hose. Watch closely for change.

## 2021-03-01 ENCOUNTER — OFFICE VISIT (OUTPATIENT)
Dept: FAMILY MEDICINE CLINIC | Facility: CLINIC | Age: 31
End: 2021-03-01

## 2021-03-01 VITALS
TEMPERATURE: 97.3 F | WEIGHT: 315 LBS | HEIGHT: 73 IN | HEART RATE: 110 BPM | DIASTOLIC BLOOD PRESSURE: 88 MMHG | RESPIRATION RATE: 18 BRPM | BODY MASS INDEX: 41.75 KG/M2 | OXYGEN SATURATION: 96 % | SYSTOLIC BLOOD PRESSURE: 140 MMHG

## 2021-03-01 DIAGNOSIS — E66.01 CLASS 3 SEVERE OBESITY DUE TO EXCESS CALORIES WITH SERIOUS COMORBIDITY AND BODY MASS INDEX (BMI) OF 40.0 TO 44.9 IN ADULT (HCC): ICD-10-CM

## 2021-03-01 DIAGNOSIS — I10 ESSENTIAL HYPERTENSION: ICD-10-CM

## 2021-03-01 DIAGNOSIS — M51.26 DISPLACEMENT OF LUMBAR INTERVERTEBRAL DISC WITHOUT MYELOPATHY: Primary | ICD-10-CM

## 2021-03-01 PROBLEM — E66.813 CLASS 3 SEVERE OBESITY DUE TO EXCESS CALORIES WITH SERIOUS COMORBIDITY AND BODY MASS INDEX (BMI) OF 40.0 TO 44.9 IN ADULT: Status: ACTIVE | Noted: 2020-06-03

## 2021-03-01 LAB
BILIRUB BLD-MCNC: NEGATIVE MG/DL
CLARITY, POC: CLEAR
COLOR UR: YELLOW
GLUCOSE UR STRIP-MCNC: NEGATIVE MG/DL
KETONES UR QL: NEGATIVE
LEUKOCYTE EST, POC: NEGATIVE
NITRITE UR-MCNC: NEGATIVE MG/ML
PH UR: 5 [PH] (ref 5–8)
PROT UR STRIP-MCNC: NEGATIVE MG/DL
RBC # UR STRIP: NEGATIVE /UL
SP GR UR: 1.02 (ref 1–1.03)
UROBILINOGEN UR QL: NORMAL

## 2021-03-01 PROCEDURE — 99213 OFFICE O/P EST LOW 20 MIN: CPT | Performed by: FAMILY MEDICINE

## 2021-03-01 PROCEDURE — 81002 URINALYSIS NONAUTO W/O SCOPE: CPT | Performed by: FAMILY MEDICINE

## 2021-03-01 RX ORDER — CYCLOBENZAPRINE HCL 10 MG
10 TABLET ORAL 3 TIMES DAILY
Qty: 30 TABLET | Refills: 2 | Status: SHIPPED | OUTPATIENT
Start: 2021-03-01 | End: 2021-04-29 | Stop reason: SDUPTHER

## 2021-03-01 RX ORDER — PREDNISONE 10 MG/1
10 TABLET ORAL TAKE AS DIRECTED
Qty: 35 TABLET | Refills: 0 | Status: SHIPPED | OUTPATIENT
Start: 2021-03-01 | End: 2021-03-16

## 2021-03-01 NOTE — PROGRESS NOTES
Chief Complaint  Back Pain, Hypertension, and Hyperlipidemia    Subjective     CC  Problem List  Visit Diagnosis   Encounters  Notes  Medications  Labs  Result Review Imaging  Media    Bobby Rodriguez presents to [facility] for   Back Pain  This is a new problem. The current episode started 1 to 4 weeks ago. The problem occurs constantly. The problem is unchanged. The pain is present in the lumbar spine. The quality of the pain is described as stabbing. The pain radiates to the right foot, right knee and right thigh. The pain is at a severity of 6/10. The pain is mild. The pain is the same all the time. The symptoms are aggravated by position and standing. Pertinent negatives include no bladder incontinence, bowel incontinence, dysuria, fever, headaches, paresthesias or weakness. He has tried heat, ice, muscle relaxant and NSAIDs for the symptoms. The treatment provided mild relief.   Hypertension  This is a chronic problem. The current episode started more than 1 year ago. The problem is uncontrolled. Pertinent negatives include no headaches, orthopnea, palpitations, peripheral edema, PND or sweats. Risk factors for coronary artery disease include male gender, obesity, family history and dyslipidemia. Current antihypertension treatment includes nothing.       Review of Systems   Constitutional: Negative for fever.   Cardiovascular: Negative for palpitations, orthopnea and PND.   Gastrointestinal: Negative for bowel incontinence.   Endocrine: Negative for cold intolerance, heat intolerance, polydipsia and polyuria.   Genitourinary: Negative for dysuria and urinary incontinence.   Musculoskeletal: Positive for back pain.   Neurological: Negative for weakness and paresthesias.   Hematological: Negative for adenopathy. Does not bruise/bleed easily.        Objective   Vital Signs:   /88 (BP Location: Right arm, Patient Position: Sitting, Cuff Size: Adult)   Pulse 110   " Temp 97.3 °F (36.3 °C) (Temporal)   Resp 18   Ht 184.2 cm (72.5\")   Wt (!) 151 kg (331 lb 12.8 oz)   SpO2 96% Comment: room air  BMI 44.38 kg/m²     Physical Exam  Constitutional:       General: He is not in acute distress.     Appearance: He is well-developed.   Neck:      Thyroid: No thyromegaly.      Vascular: No JVD.   Cardiovascular:      Rate and Rhythm: Normal rate and regular rhythm.      Heart sounds: Normal heart sounds. No murmur. No friction rub. No gallop.    Pulmonary:      Effort: Pulmonary effort is normal. No respiratory distress.      Breath sounds: Normal breath sounds. No wheezing or rales.   Abdominal:      General: Bowel sounds are normal. There is no distension.      Palpations: Abdomen is soft. There is no mass.      Tenderness: There is no abdominal tenderness.   Musculoskeletal:      Cervical back: Neck supple.      Lumbar back: Tenderness present. No swelling or deformity. Positive right straight leg raise test.   Lymphadenopathy:      Cervical: No cervical adenopathy.   Skin:     Findings: No rash.   Neurological:      Mental Status: He is alert and oriented to person, place, and time.      Sensory: No sensory deficit.      Motor: No weakness.      Coordination: Coordination normal.      Gait: Gait normal.      Deep Tendon Reflexes: Reflexes normal.        Result Review :Labs  Result Review  Imaging  Med Tab  Media                 Assessment and Plan CC Problem List  Visit Diagnosis  ROS  Review (Popup)  Health Maintenance  Quality  BestPractice  Medications  SmartSets  SnapShot Encounters  Media  Problem List Items Addressed This Visit        Unprioritized    Displacement of lumbar intervertebral disc without myelopathy - Primary    Overview     Hx of diskectomy, Ice heat ROM, systemic steroids, and muscle relaxer's  F.u should sxs not improve and resolve over 1 week.          Relevant Medications    predniSONE (DELTASONE) 10 MG tablet    cyclobenzaprine (FLEXERIL) " 10 MG tablet    Other Relevant Orders    POCT urinalysis dipstick, manual (Completed)    Class 3 severe obesity due to excess calories with serious comorbidity and body mass index (BMI) of 40.0 to 44.9 in adult (CMS/[facility])    Overview     Diet exercise and wt loss encouraged consequences of obesity discussed including back  Pain.          Current Assessment & Plan     Patient's (Body mass index is 44.38 kg/m².) indicates that they are morbidly obese (BMI > 40 or > 35 with obesity - related health condition) with obesity-related health conditions that include hypertension . Obesity is unchanged. BMI is is above average; BMI management plan is completed. We discussed portion control and increasing exercise.          Hypertension    Overview     Controlled marginally         Current Assessment & Plan     Hypertension is improving with treatment.  Continue current treatment regimen.  Dietary sodium restriction.  Weight loss.  Regular aerobic exercise.  Blood pressure will be reassessed in 3 months.               Follow Up Instructions Charge Capture  Follow-up Communications  No follow-ups on file.  Patient was given instructions and counseling regarding his condition or for health maintenance advice. Please see specific information pulled into the AVS if appropriate.

## 2021-03-06 NOTE — ASSESSMENT & PLAN NOTE
Patient's (Body mass index is 44.38 kg/m².) indicates that they are morbidly obese (BMI > 40 or > 35 with obesity - related health condition) with obesity-related health conditions that include hypertension . Obesity is unchanged. BMI is is above average; BMI management plan is completed. We discussed portion control and increasing exercise.

## 2021-04-29 ENCOUNTER — CLINICAL SUPPORT (OUTPATIENT)
Dept: FAMILY MEDICINE CLINIC | Facility: CLINIC | Age: 31
End: 2021-04-29

## 2021-04-29 VITALS
WEIGHT: 315 LBS | SYSTOLIC BLOOD PRESSURE: 132 MMHG | TEMPERATURE: 98.5 F | HEIGHT: 73 IN | RESPIRATION RATE: 18 BRPM | HEART RATE: 84 BPM | BODY MASS INDEX: 41.75 KG/M2 | DIASTOLIC BLOOD PRESSURE: 84 MMHG | OXYGEN SATURATION: 98 %

## 2021-04-29 DIAGNOSIS — Z02.4 ENCOUNTER FOR CDL (COMMERCIAL DRIVING LICENSE) EXAM: Primary | ICD-10-CM

## 2021-04-29 LAB
BILIRUB BLD-MCNC: NEGATIVE MG/DL
CLARITY, POC: CLEAR
COLOR UR: YELLOW
GLUCOSE UR STRIP-MCNC: NEGATIVE MG/DL
KETONES UR QL: NEGATIVE
LEUKOCYTE EST, POC: NEGATIVE
NITRITE UR-MCNC: NEGATIVE MG/ML
PH UR: 6 [PH] (ref 5–8)
PROT UR STRIP-MCNC: ABNORMAL MG/DL
RBC # UR STRIP: NEGATIVE /UL
SP GR UR: 1.02 (ref 1–1.03)
UROBILINOGEN UR QL: NORMAL

## 2021-04-29 PROCEDURE — 81003 URINALYSIS AUTO W/O SCOPE: CPT | Performed by: FAMILY MEDICINE

## 2021-04-29 PROCEDURE — DOTPHY: Performed by: FAMILY MEDICINE

## 2021-04-29 NOTE — PROGRESS NOTES
Medical Examination    Subjective   Bobby Rodriguez is a 30 y.o. male who presents today for a  fitness determination physical exam. The patient reports no problems.  The following portions of the patient's history were reviewed and updated as appropriate: allergies, current medications, past family history, past medical history, past social history, past surgical history and problem list.  Review of Systems  A comprehensive review of systems was negative.    Objective    Vision:  No exam data present    Applicant can recognize and distinguish among traffic control signals and devices showing standard red, green, and lebron colors.  Applicant has peripheral vision to 90 degrees in each eye.         Monocular Vision?: No      Hearing:  Applicant can distinguish forced whisper at a distance of 5 feet with both ears.         Physical Exam  Constitutional:       Appearance: He is well-developed.   HENT:      Head: Normocephalic and atraumatic.      Right Ear: External ear normal.      Left Ear: External ear normal.      Nose: Nose normal.   Eyes:      Pupils: Pupils are equal, round, and reactive to light.   Cardiovascular:      Rate and Rhythm: Normal rate and regular rhythm.      Heart sounds: Normal heart sounds.   Pulmonary:      Effort: Pulmonary effort is normal.      Breath sounds: Normal breath sounds.   Abdominal:      General: Bowel sounds are normal.      Palpations: Abdomen is soft.   Musculoskeletal:         General: Normal range of motion.      Cervical back: Normal range of motion and neck supple.   Skin:     General: Skin is warm and dry.   Neurological:      Mental Status: He is alert and oriented to person, place, and time.   Psychiatric:         Behavior: Behavior normal.         Thought Content: Thought content normal.         Judgment: Judgment normal.          Labs:  Lab Results   Component Value Date    SPECGRAV 1.020 04/29/2021    BILIRUBINUR Negative  04/29/2021    GLUCOSEU Negative 05/16/2019       Assessment/Plan   Healthy male exam.   Meets standards in 51 .41;  qualifies for 2 year certificate.     Medical examiners certificate completed and printed.  Return as needed.

## 2021-05-12 DIAGNOSIS — D75.1 POLYCYTHEMIA: ICD-10-CM

## 2021-05-12 DIAGNOSIS — E78.2 MIXED HYPERLIPIDEMIA: Primary | ICD-10-CM

## 2021-05-12 DIAGNOSIS — R73.9 HYPERGLYCEMIA: ICD-10-CM

## 2021-05-13 LAB
ALBUMIN SERPL-MCNC: 4.6 G/DL (ref 4.1–5.2)
ALBUMIN/GLOB SERPL: 1.7 {RATIO} (ref 1.2–2.2)
ALP SERPL-CCNC: 73 IU/L (ref 39–117)
ALT SERPL-CCNC: 74 IU/L (ref 0–44)
AST SERPL-CCNC: 38 IU/L (ref 0–40)
BASOPHILS # BLD AUTO: 0 X10E3/UL (ref 0–0.2)
BASOPHILS NFR BLD AUTO: 1 %
BILIRUB SERPL-MCNC: 0.6 MG/DL (ref 0–1.2)
BUN SERPL-MCNC: 19 MG/DL (ref 6–20)
BUN/CREAT SERPL: 17 (ref 9–20)
CALCIUM SERPL-MCNC: 9.6 MG/DL (ref 8.7–10.2)
CHLORIDE SERPL-SCNC: 100 MMOL/L (ref 96–106)
CHOLEST SERPL-MCNC: 209 MG/DL (ref 100–199)
CHOLEST/HDLC SERPL: 6.1 RATIO (ref 0–5)
CO2 SERPL-SCNC: 25 MMOL/L (ref 20–29)
CREAT SERPL-MCNC: 1.1 MG/DL (ref 0.76–1.27)
EOSINOPHIL # BLD AUTO: 0.2 X10E3/UL (ref 0–0.4)
EOSINOPHIL NFR BLD AUTO: 3 %
ERYTHROCYTE [DISTWIDTH] IN BLOOD BY AUTOMATED COUNT: 13.6 % (ref 11.6–15.4)
GLOBULIN SER CALC-MCNC: 2.7 G/DL (ref 1.5–4.5)
GLUCOSE SERPL-MCNC: 93 MG/DL (ref 65–99)
HBA1C MFR BLD: 5.6 % (ref 4.8–5.6)
HCT VFR BLD AUTO: 48.4 % (ref 37.5–51)
HDLC SERPL-MCNC: 34 MG/DL
HGB BLD-MCNC: 16.3 G/DL (ref 13–17.7)
IMM GRANULOCYTES # BLD AUTO: 0 X10E3/UL (ref 0–0.1)
IMM GRANULOCYTES NFR BLD AUTO: 1 %
LDLC SERPL CALC-MCNC: 128 MG/DL (ref 0–99)
LYMPHOCYTES # BLD AUTO: 1.6 X10E3/UL (ref 0.7–3.1)
LYMPHOCYTES NFR BLD AUTO: 26 %
MCH RBC QN AUTO: 29.9 PG (ref 26.6–33)
MCHC RBC AUTO-ENTMCNC: 33.7 G/DL (ref 31.5–35.7)
MCV RBC AUTO: 89 FL (ref 79–97)
MONOCYTES # BLD AUTO: 0.5 X10E3/UL (ref 0.1–0.9)
MONOCYTES NFR BLD AUTO: 8 %
NEUTROPHILS # BLD AUTO: 3.9 X10E3/UL (ref 1.4–7)
NEUTROPHILS NFR BLD AUTO: 61 %
PLATELET # BLD AUTO: 235 X10E3/UL (ref 150–450)
POTASSIUM SERPL-SCNC: 5.7 MMOL/L (ref 3.5–5.2)
PROT SERPL-MCNC: 7.3 G/DL (ref 6–8.5)
RBC # BLD AUTO: 5.46 X10E6/UL (ref 4.14–5.8)
SODIUM SERPL-SCNC: 141 MMOL/L (ref 134–144)
TRIGL SERPL-MCNC: 262 MG/DL (ref 0–149)
VLDLC SERPL CALC-MCNC: 47 MG/DL (ref 5–40)
WBC # BLD AUTO: 6.3 X10E3/UL (ref 3.4–10.8)

## 2021-05-17 ENCOUNTER — OFFICE VISIT (OUTPATIENT)
Dept: FAMILY MEDICINE CLINIC | Facility: CLINIC | Age: 31
End: 2021-05-17

## 2021-05-17 VITALS
OXYGEN SATURATION: 96 % | BODY MASS INDEX: 39.17 KG/M2 | TEMPERATURE: 97.5 F | SYSTOLIC BLOOD PRESSURE: 125 MMHG | RESPIRATION RATE: 18 BRPM | DIASTOLIC BLOOD PRESSURE: 86 MMHG | HEART RATE: 82 BPM | HEIGHT: 75 IN | WEIGHT: 315 LBS

## 2021-05-17 DIAGNOSIS — F41.9 ANXIETY: ICD-10-CM

## 2021-05-17 DIAGNOSIS — I10 ESSENTIAL HYPERTENSION: Primary | ICD-10-CM

## 2021-05-17 DIAGNOSIS — E78.2 MIXED HYPERLIPIDEMIA: Primary | ICD-10-CM

## 2021-05-17 DIAGNOSIS — R73.9 HYPERGLYCEMIA: ICD-10-CM

## 2021-05-17 DIAGNOSIS — E87.5 HYPERKALEMIA: ICD-10-CM

## 2021-05-17 DIAGNOSIS — E78.2 MIXED HYPERLIPIDEMIA: ICD-10-CM

## 2021-05-17 DIAGNOSIS — D75.1 POLYCYTHEMIA: ICD-10-CM

## 2021-05-17 DIAGNOSIS — R74.8 ABNORMAL LIVER ENZYMES: ICD-10-CM

## 2021-05-17 PROCEDURE — 99214 OFFICE O/P EST MOD 30 MIN: CPT | Performed by: FAMILY MEDICINE

## 2021-05-17 RX ORDER — CITALOPRAM 10 MG/1
10 TABLET ORAL DAILY
Qty: 30 TABLET | Refills: 5 | Status: SHIPPED | OUTPATIENT
Start: 2021-05-17 | End: 2022-06-29

## 2021-05-17 RX ORDER — ATORVASTATIN CALCIUM 10 MG/1
10 TABLET, FILM COATED ORAL DAILY
Qty: 30 TABLET | Refills: 5 | Status: SHIPPED | OUTPATIENT
Start: 2021-05-17 | End: 2022-06-29

## 2021-05-18 ENCOUNTER — TELEPHONE (OUTPATIENT)
Dept: FAMILY MEDICINE CLINIC | Facility: CLINIC | Age: 31
End: 2021-05-18

## 2021-05-18 DIAGNOSIS — E87.5 HYPERKALEMIA: Primary | ICD-10-CM

## 2021-05-18 DIAGNOSIS — E87.5 HYPERKALEMIA: ICD-10-CM

## 2021-05-18 LAB
ALBUMIN SERPL-MCNC: 4.9 G/DL (ref 4.1–5.2)
ALBUMIN/GLOB SERPL: 1.8 {RATIO} (ref 1.2–2.2)
ALP SERPL-CCNC: 73 IU/L (ref 48–121)
ALT SERPL-CCNC: 58 IU/L (ref 0–44)
AST SERPL-CCNC: 28 IU/L (ref 0–40)
BILIRUB SERPL-MCNC: 0.3 MG/DL (ref 0–1.2)
BUN SERPL-MCNC: 18 MG/DL (ref 6–20)
BUN/CREAT SERPL: 14 (ref 9–20)
CALCIUM SERPL-MCNC: 9.5 MG/DL (ref 8.7–10.2)
CHLORIDE SERPL-SCNC: 103 MMOL/L (ref 96–106)
CO2 SERPL-SCNC: 20 MMOL/L (ref 20–29)
CREAT SERPL-MCNC: 1.26 MG/DL (ref 0.76–1.27)
GLOBULIN SER CALC-MCNC: 2.8 G/DL (ref 1.5–4.5)
GLUCOSE SERPL-MCNC: ABNORMAL MG/DL
POTASSIUM SERPL-SCNC: ABNORMAL MMOL/L
PROT SERPL-MCNC: 7.7 G/DL (ref 6–8.5)
SODIUM SERPL-SCNC: 139 MMOL/L (ref 134–144)

## 2021-05-18 NOTE — TELEPHONE ENCOUNTER
LMOM for patient that [facility] did not do the potassium in the CMP and that we need him to go to [facility] for a CMP

## 2021-05-18 NOTE — TELEPHONE ENCOUNTER
Notified patient that his potassium level was normal that was done at [facility]

## 2021-07-22 ENCOUNTER — TELEPHONE (OUTPATIENT)
Dept: FAMILY MEDICINE CLINIC | Facility: CLINIC | Age: 31
End: 2021-07-22

## 2021-07-22 DIAGNOSIS — E78.2 MIXED HYPERLIPIDEMIA: Primary | ICD-10-CM

## 2021-07-22 DIAGNOSIS — R73.9 HYPERGLYCEMIA: ICD-10-CM

## 2021-07-22 NOTE — TELEPHONE ENCOUNTER
Called and left a message for Bobby to go get labs drawn and then I will call to reschedule a follow up.

## 2022-05-03 ENCOUNTER — TELEPHONE (OUTPATIENT)
Dept: FAMILY MEDICINE CLINIC | Facility: CLINIC | Age: 32
End: 2022-05-03

## 2022-06-29 ENCOUNTER — OFFICE VISIT (OUTPATIENT)
Dept: FAMILY MEDICINE CLINIC | Facility: CLINIC | Age: 32
End: 2022-06-29

## 2022-06-29 VITALS
TEMPERATURE: 98 F | HEART RATE: 82 BPM | HEIGHT: 75 IN | SYSTOLIC BLOOD PRESSURE: 132 MMHG | WEIGHT: 315 LBS | BODY MASS INDEX: 39.17 KG/M2 | DIASTOLIC BLOOD PRESSURE: 86 MMHG | RESPIRATION RATE: 20 BRPM | OXYGEN SATURATION: 98 %

## 2022-06-29 DIAGNOSIS — I10 PRIMARY HYPERTENSION: Primary | ICD-10-CM

## 2022-06-29 DIAGNOSIS — E66.01 CLASS 3 SEVERE OBESITY DUE TO EXCESS CALORIES WITH SERIOUS COMORBIDITY AND BODY MASS INDEX (BMI) OF 40.0 TO 44.9 IN ADULT: ICD-10-CM

## 2022-06-29 DIAGNOSIS — E78.2 MIXED HYPERLIPIDEMIA: ICD-10-CM

## 2022-06-29 PROBLEM — I83.90 VARICOSE VEIN OF LEG: Status: RESOLVED | Noted: 2021-01-18 | Resolved: 2022-06-29

## 2022-06-29 PROCEDURE — 99214 OFFICE O/P EST MOD 30 MIN: CPT | Performed by: FAMILY MEDICINE

## 2022-06-29 RX ORDER — ATORVASTATIN CALCIUM 10 MG/1
20 TABLET, FILM COATED ORAL DAILY
Qty: 90 TABLET | Refills: 3 | Status: SHIPPED | OUTPATIENT
Start: 2022-06-29 | End: 2022-08-04 | Stop reason: SDUPTHER

## 2022-06-29 RX ORDER — LISINOPRIL 10 MG/1
10 TABLET ORAL DAILY
Qty: 90 TABLET | Refills: 3 | Status: SHIPPED | OUTPATIENT
Start: 2022-06-29 | End: 2022-08-04 | Stop reason: SDUPTHER

## 2022-06-29 NOTE — PROGRESS NOTES
"Chief Complaint  Hypertension    Subjective     CC  Problem List  Visit Diagnosis   Encounters  Notes  Medications  Labs  Result Review Imaging  Media    Bobby Rodriguez presents to [facility] for   Hypertension  This is a chronic problem. The current episode started more than 1 year ago. The problem is controlled. Associated symptoms include chest pain, headaches, orthopnea, palpitations and PND. Pertinent negatives include no shortness of breath. Risk factors for coronary artery disease include dyslipidemia, male gender and family history. Current antihypertension treatment includes nothing. The current treatment provides mild improvement.       Review of Systems   Constitutional: Positive for unexpected weight gain. Negative for appetite change, fatigue, fever and unexpected weight loss.   Respiratory: Negative for shortness of breath.    Cardiovascular: Positive for chest pain, palpitations, orthopnea and PND. Negative for leg swelling.   Gastrointestinal: Negative for abdominal pain, constipation, diarrhea and nausea.   Endocrine: Negative for cold intolerance, heat intolerance, polydipsia and polyuria.   Genitourinary: Negative for dysuria.   Skin: Negative for rash.   Hematological: Negative for adenopathy. Does not bruise/bleed easily.        Objective   Vital Signs:   /86 (BP Location: Right arm, Patient Position: Sitting, Cuff Size: Large Adult)   Pulse 82   Temp 98 °F (36.7 °C) (Temporal)   Resp 20   Ht 190.5 cm (75\")   Wt (!) 158 kg (347 lb 12.8 oz)   SpO2 98% Comment: room air  BMI 43.47 kg/m²     Physical Exam  Constitutional:       General: He is not in acute distress.  Cardiovascular:      Rate and Rhythm: Normal rate.      Heart sounds: No murmur heard.  Pulmonary:      Effort: Pulmonary effort is normal.      Breath sounds: Normal breath sounds.   Musculoskeletal:      Cervical back: Neck supple.      Right lower leg: No edema.      Left " lower leg: No edema.   Lymphadenopathy:      Cervical: No cervical adenopathy.   Neurological:      Mental Status: He is alert.        Result Review :Labs  Result Review  Imaging  Med Tab  Media                 Assessment and Plan CC Problem List  Visit Diagnosis  ROS  Review (Popup)  Health Maintenance  Quality  BestPractice  Medications  SmartSets  SnapShot Encounters  Media  Problem List Items Addressed This Visit        Unprioritized    Mixed hyperlipidemia    Overview     Compliant lab notify of results.            Current Assessment & Plan     Lipid abnormalities are improving with treatment.  Nutritional counseling was provided. and Pharmacotherapy as ordered.  Lipids will be reassessed 1 mo.           Relevant Medications    atorvastatin (Lipitor) 10 MG tablet    Other Relevant Orders    Lipid Panel With / Chol / HDL Ratio    Class 3 severe obesity due to excess calories with serious comorbidity in adult (HCC)    Overview     Diet exercise and wt loss encouraged consequences of obesity discussed including back  Pain.            Hypertension - Primary    Overview     Exacerbation, begin meds and f/u w/ [facility] 1 mo as scheduled.            Current Assessment & Plan     Hypertension is worsening.  Dietary sodium restriction.  Weight loss.  Regular aerobic exercise.  Medication changes per orders.  Blood pressure will be reassessed in 4 weeks.           Relevant Medications    lisinopril (PRINIVIL,ZESTRIL) 10 MG tablet    Other Relevant Orders    CBC & Differential    Comprehensive Metabolic Panel    TSH          Follow Up Instructions Charge Capture  Follow-up Communications  Return in about 4 weeks (around 7/27/2022).  Patient was given instructions and counseling regarding his condition or for health maintenance advice. Please see specific information pulled into the AVS if appropriate.

## 2022-06-30 LAB
ALBUMIN SERPL-MCNC: 4.6 G/DL (ref 4.1–5.2)
ALBUMIN/GLOB SERPL: 1.6 {RATIO} (ref 1.2–2.2)
ALP SERPL-CCNC: 84 IU/L (ref 44–121)
ALT SERPL-CCNC: 69 IU/L (ref 0–44)
AST SERPL-CCNC: 33 IU/L (ref 0–40)
BASOPHILS # BLD AUTO: 0 X10E3/UL (ref 0–0.2)
BASOPHILS NFR BLD AUTO: 1 %
BILIRUB SERPL-MCNC: 0.4 MG/DL (ref 0–1.2)
BUN SERPL-MCNC: 17 MG/DL (ref 6–20)
BUN/CREAT SERPL: 14 (ref 9–20)
CALCIUM SERPL-MCNC: 9.3 MG/DL (ref 8.7–10.2)
CHLORIDE SERPL-SCNC: 103 MMOL/L (ref 96–106)
CHOLEST SERPL-MCNC: 191 MG/DL (ref 100–199)
CHOLEST/HDLC SERPL: 6.4 RATIO (ref 0–5)
CO2 SERPL-SCNC: 24 MMOL/L (ref 20–29)
CREAT SERPL-MCNC: 1.18 MG/DL (ref 0.76–1.27)
EGFRCR SERPLBLD CKD-EPI 2021: 86 ML/MIN/1.73
EOSINOPHIL # BLD AUTO: 0.2 X10E3/UL (ref 0–0.4)
EOSINOPHIL NFR BLD AUTO: 3 %
ERYTHROCYTE [DISTWIDTH] IN BLOOD BY AUTOMATED COUNT: 13.7 % (ref 11.6–15.4)
GLOBULIN SER CALC-MCNC: 2.8 G/DL (ref 1.5–4.5)
GLUCOSE SERPL-MCNC: 132 MG/DL (ref 65–99)
HCT VFR BLD AUTO: 47.7 % (ref 37.5–51)
HDLC SERPL-MCNC: 30 MG/DL
HGB BLD-MCNC: 16 G/DL (ref 13–17.7)
IMM GRANULOCYTES # BLD AUTO: 0 X10E3/UL (ref 0–0.1)
IMM GRANULOCYTES NFR BLD AUTO: 1 %
LDLC SERPL CALC-MCNC: 103 MG/DL (ref 0–99)
LYMPHOCYTES # BLD AUTO: 1.8 X10E3/UL (ref 0.7–3.1)
LYMPHOCYTES NFR BLD AUTO: 26 %
MCH RBC QN AUTO: 29.9 PG (ref 26.6–33)
MCHC RBC AUTO-ENTMCNC: 33.5 G/DL (ref 31.5–35.7)
MCV RBC AUTO: 89 FL (ref 79–97)
MONOCYTES # BLD AUTO: 0.5 X10E3/UL (ref 0.1–0.9)
MONOCYTES NFR BLD AUTO: 8 %
NEUTROPHILS # BLD AUTO: 4.2 X10E3/UL (ref 1.4–7)
NEUTROPHILS NFR BLD AUTO: 61 %
PLATELET # BLD AUTO: 201 X10E3/UL (ref 150–450)
POTASSIUM SERPL-SCNC: 4.9 MMOL/L (ref 3.5–5.2)
PROT SERPL-MCNC: 7.4 G/DL (ref 6–8.5)
RBC # BLD AUTO: 5.35 X10E6/UL (ref 4.14–5.8)
SODIUM SERPL-SCNC: 141 MMOL/L (ref 134–144)
TRIGL SERPL-MCNC: 338 MG/DL (ref 0–149)
TSH SERPL DL<=0.005 MIU/L-ACNC: 1.54 UIU/ML (ref 0.45–4.5)
VLDLC SERPL CALC-MCNC: 58 MG/DL (ref 5–40)
WBC # BLD AUTO: 6.7 X10E3/UL (ref 3.4–10.8)

## 2022-07-01 LAB
HBA1C MFR BLD: 6.1 % (ref 4.8–5.6)
SPECIMEN STATUS: NORMAL

## 2022-08-04 ENCOUNTER — OFFICE VISIT (OUTPATIENT)
Dept: FAMILY MEDICINE CLINIC | Facility: CLINIC | Age: 32
End: 2022-08-04

## 2022-08-04 VITALS
BODY MASS INDEX: 38.36 KG/M2 | HEART RATE: 76 BPM | WEIGHT: 315 LBS | OXYGEN SATURATION: 98 % | SYSTOLIC BLOOD PRESSURE: 124 MMHG | HEIGHT: 76 IN | RESPIRATION RATE: 18 BRPM | DIASTOLIC BLOOD PRESSURE: 72 MMHG | TEMPERATURE: 98.1 F

## 2022-08-04 DIAGNOSIS — E87.5 HYPERKALEMIA: ICD-10-CM

## 2022-08-04 DIAGNOSIS — M51.46: ICD-10-CM

## 2022-08-04 DIAGNOSIS — M79.604 RIGHT LEG PAIN: ICD-10-CM

## 2022-08-04 DIAGNOSIS — E78.2 MIXED HYPERLIPIDEMIA: Primary | ICD-10-CM

## 2022-08-04 DIAGNOSIS — D75.1 POLYCYTHEMIA: ICD-10-CM

## 2022-08-04 DIAGNOSIS — R73.9 HYPERGLYCEMIA: ICD-10-CM

## 2022-08-04 DIAGNOSIS — F41.9 ANXIETY: ICD-10-CM

## 2022-08-04 DIAGNOSIS — R74.8 ABNORMAL LIVER ENZYMES: ICD-10-CM

## 2022-08-04 DIAGNOSIS — M77.01 BILATERAL MEDIAL EPICONDYLITIS OF ELBOW JOINT: ICD-10-CM

## 2022-08-04 DIAGNOSIS — F43.9 SITUATIONAL STRESS: ICD-10-CM

## 2022-08-04 DIAGNOSIS — M51.26 DISPLACEMENT OF LUMBAR INTERVERTEBRAL DISC WITHOUT MYELOPATHY: ICD-10-CM

## 2022-08-04 DIAGNOSIS — R06.83 SNORING: ICD-10-CM

## 2022-08-04 DIAGNOSIS — E66.01 CLASS 3 SEVERE OBESITY DUE TO EXCESS CALORIES WITH SERIOUS COMORBIDITY AND BODY MASS INDEX (BMI) OF 40.0 TO 44.9 IN ADULT: ICD-10-CM

## 2022-08-04 DIAGNOSIS — R53.83 LETHARGY: ICD-10-CM

## 2022-08-04 DIAGNOSIS — Z00.01 ENCOUNTER FOR GENERAL ADULT MEDICAL EXAMINATION WITH ABNORMAL FINDINGS: ICD-10-CM

## 2022-08-04 DIAGNOSIS — B07.0 PLANTAR WART OF BOTH FEET: ICD-10-CM

## 2022-08-04 DIAGNOSIS — M77.02 BILATERAL MEDIAL EPICONDYLITIS OF ELBOW JOINT: ICD-10-CM

## 2022-08-04 DIAGNOSIS — I10 PRIMARY HYPERTENSION: ICD-10-CM

## 2022-08-04 DIAGNOSIS — R79.89 ELEVATED SERUM CREATININE: ICD-10-CM

## 2022-08-04 DIAGNOSIS — F51.3 SLEEP WALKING: ICD-10-CM

## 2022-08-04 PROCEDURE — 99214 OFFICE O/P EST MOD 30 MIN: CPT | Performed by: FAMILY MEDICINE

## 2022-08-04 PROCEDURE — 99395 PREV VISIT EST AGE 18-39: CPT | Performed by: FAMILY MEDICINE

## 2022-08-04 RX ORDER — LISINOPRIL 10 MG/1
10 TABLET ORAL DAILY
Qty: 90 TABLET | Refills: 3 | Status: SHIPPED | OUTPATIENT
Start: 2022-08-04

## 2022-08-04 RX ORDER — ATORVASTATIN CALCIUM 10 MG/1
20 TABLET, FILM COATED ORAL DAILY
Qty: 90 TABLET | Refills: 3 | Status: SHIPPED | OUTPATIENT
Start: 2022-08-04 | End: 2022-08-04 | Stop reason: SDUPTHER

## 2022-08-04 RX ORDER — ATORVASTATIN CALCIUM 10 MG/1
10 TABLET, FILM COATED ORAL DAILY
Qty: 90 TABLET | Refills: 3 | Status: SHIPPED | OUTPATIENT
Start: 2022-08-04

## 2022-08-04 NOTE — ASSESSMENT & PLAN NOTE
Lipid and CMP done 6-, read by me, reviewed with pt.  Trig. 338 up from 262, Tot. Chol. 191 down from 209, HDL 30 down from 34,  down from 128  Worsening.   Encouraged to watch fatty intake, exercise more, and lose weight.   Compliant with medication.  Patient tolerated Lipitor well without side effects. I feel the benefits of the medication outweigh the risks.  Is not getting adequate diet and exercise  Goals developed at last visit were not met because diet and exercise.  Follow up in 3  months  Care management needs are self-addressed.  Self-management abilities addressed and patient is capable of managing her own disease.

## 2022-08-04 NOTE — ASSESSMENT & PLAN NOTE
Worse.  CMP done 6-, read by me, reviewed with pt.  AST was normal x 3, ALT was 69 up from 58.  Discussed possibly from medication or alcohol intake, patient has cut down on his alcohol intake.

## 2022-08-04 NOTE — ASSESSMENT & PLAN NOTE
Worse.  A1c done 6-, read by me, reviewed with pt.  A1c was 6.1 up from 5.6.  Start Metformin 500 mg daily.  Benefits and risks discussed advised him I felt the benefits outweighed the risk he agrees.  Encourged to watch sugar intake, exercise more, lose weight,

## 2022-08-04 NOTE — ASSESSMENT & PLAN NOTE
Doing well.  Patient tolerated Lisinopril well without side effects. I feel the benefits of the medication outweigh the risks.  Encouraged to watch salt, exercise more and lose weight.
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
## 2022-08-04 NOTE — ASSESSMENT & PLAN NOTE
Patient's (Body mass index is 42.8 kg/m².) indicates that they are morbidly obese (BMI > 40 or > 35 with obesity - related health condition) with health conditions that include hypertension and dyslipidemias . Weight is worsening. BMI is is above average; BMI management plan is completed. We discussed portion control and increasing exercise.   Diet exercise and wt loss encouraged consequences of obesity discussed

## 2022-11-09 ENCOUNTER — TELEPHONE (OUTPATIENT)
Dept: FAMILY MEDICINE CLINIC | Facility: CLINIC | Age: 32
End: 2022-11-09

## 2022-11-09 DIAGNOSIS — R73.9 HYPERGLYCEMIA: ICD-10-CM

## 2022-11-09 DIAGNOSIS — E78.2 MIXED HYPERLIPIDEMIA: Primary | ICD-10-CM

## 2022-12-14 ENCOUNTER — LAB (OUTPATIENT)
Dept: FAMILY MEDICINE CLINIC | Facility: CLINIC | Age: 32
End: 2022-12-14

## 2022-12-15 LAB
ALBUMIN SERPL-MCNC: 4.9 G/DL (ref 4.1–5.2)
ALBUMIN/GLOB SERPL: 2.1 {RATIO} (ref 1.2–2.2)
ALP SERPL-CCNC: 86 IU/L (ref 44–121)
ALT SERPL-CCNC: 75 IU/L (ref 0–44)
AST SERPL-CCNC: 40 IU/L (ref 0–40)
BILIRUB SERPL-MCNC: 0.6 MG/DL (ref 0–1.2)
BUN SERPL-MCNC: 16 MG/DL (ref 6–20)
BUN/CREAT SERPL: 14 (ref 9–20)
CALCIUM SERPL-MCNC: 9.6 MG/DL (ref 8.7–10.2)
CHLORIDE SERPL-SCNC: 102 MMOL/L (ref 96–106)
CHOLEST SERPL-MCNC: 187 MG/DL (ref 100–199)
CHOLEST/HDLC SERPL: 6.4 RATIO (ref 0–5)
CO2 SERPL-SCNC: 24 MMOL/L (ref 20–29)
CREAT SERPL-MCNC: 1.16 MG/DL (ref 0.76–1.27)
EGFRCR SERPLBLD CKD-EPI 2021: 87 ML/MIN/1.73
GLOBULIN SER CALC-MCNC: 2.3 G/DL (ref 1.5–4.5)
GLUCOSE SERPL-MCNC: 142 MG/DL (ref 70–99)
HBA1C MFR BLD: 7.2 % (ref 4.8–5.6)
HDLC SERPL-MCNC: 29 MG/DL
LDLC SERPL CALC-MCNC: 96 MG/DL (ref 0–99)
POTASSIUM SERPL-SCNC: 5.4 MMOL/L (ref 3.5–5.2)
PROT SERPL-MCNC: 7.2 G/DL (ref 6–8.5)
SODIUM SERPL-SCNC: 140 MMOL/L (ref 134–144)
TRIGL SERPL-MCNC: 372 MG/DL (ref 0–149)
VLDLC SERPL CALC-MCNC: 62 MG/DL (ref 5–40)

## 2023-01-05 ENCOUNTER — OFFICE VISIT (OUTPATIENT)
Dept: FAMILY MEDICINE CLINIC | Facility: CLINIC | Age: 33
End: 2023-01-05
Payer: COMMERCIAL

## 2023-01-05 VITALS
RESPIRATION RATE: 15 BRPM | WEIGHT: 315 LBS | BODY MASS INDEX: 38.36 KG/M2 | HEART RATE: 79 BPM | TEMPERATURE: 97.5 F | SYSTOLIC BLOOD PRESSURE: 133 MMHG | HEIGHT: 76 IN | DIASTOLIC BLOOD PRESSURE: 85 MMHG | OXYGEN SATURATION: 97 %

## 2023-01-05 DIAGNOSIS — E66.01 CLASS 3 SEVERE OBESITY DUE TO EXCESS CALORIES WITH SERIOUS COMORBIDITY AND BODY MASS INDEX (BMI) OF 40.0 TO 44.9 IN ADULT: ICD-10-CM

## 2023-01-05 DIAGNOSIS — I10 PRIMARY HYPERTENSION: ICD-10-CM

## 2023-01-05 DIAGNOSIS — R73.9 HYPERGLYCEMIA: ICD-10-CM

## 2023-01-05 DIAGNOSIS — E78.2 MIXED HYPERLIPIDEMIA: Primary | ICD-10-CM

## 2023-01-05 DIAGNOSIS — E87.5 HYPERKALEMIA: ICD-10-CM

## 2023-01-05 DIAGNOSIS — R74.8 ABNORMAL LIVER ENZYMES: ICD-10-CM

## 2023-01-05 DIAGNOSIS — E11.9 NEW ONSET TYPE 2 DIABETES MELLITUS: ICD-10-CM

## 2023-01-05 PROCEDURE — 99214 OFFICE O/P EST MOD 30 MIN: CPT | Performed by: FAMILY MEDICINE

## 2023-01-05 NOTE — ASSESSMENT & PLAN NOTE
Mild poor control at home. Encouraged to watch salt, exercise more and lose weight.  Patient tolerated Lisinopril well without side effects. I feel the benefits of the medication outweigh the risks.  Will not increase or change medication at this time.  He feels like he can do lifestyle changes

## 2023-01-05 NOTE — ASSESSMENT & PLAN NOTE
Patient's (Body mass index is 43.14 kg/m².) indicates that they are morbidly/severely obese (BMI > 40 or > 35 with obesity - related health condition) with health conditions that include hypertension . Weight is unchanged. BMI is is above average; BMI management plan is completed. We discussed low calorie, low carb based diet program, portion control and increasing exercise.

## 2023-01-05 NOTE — ASSESSMENT & PLAN NOTE
Slightly Worsening  Encouraged to watch fatty intake, exercise more, and lose weight.   Compliant with medication   Patient tolerated lipitor well without side effects. I feel the benefits of the medication outweigh the risks.    Is not getting adequate diet and exercise  Goals developed at last visit were not met   Follow up in 2-3  months  Care management needs are self-addressed.  Self-management abilities addressed and patient is capable of managing his own disease.

## 2023-01-05 NOTE — ASSESSMENT & PLAN NOTE
New diagnosis- Hgb a1c 7.2 up from 6.1. Advised patient to start Metformin 1g BID.  Start checking blood sugars on a regular basis. Patient was given rx for glucomter and supplies; Encourged to watch sugar intake, exercise more, lose weight,  He prefers diabetic education here as opposed to referral.  Follow up in 2 weeks.
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
## 2023-01-10 ENCOUNTER — TELEPHONE (OUTPATIENT)
Dept: FAMILY MEDICINE CLINIC | Facility: CLINIC | Age: 33
End: 2023-01-10
Payer: COMMERCIAL

## 2023-01-10 NOTE — TELEPHONE ENCOUNTER
Patient phoned in regarding a side-effect that he is experiencing (Lightheadedness).  He would like a Nurse to call back on his cell  phone please.

## 2023-01-11 NOTE — TELEPHONE ENCOUNTER
Bobby called me back- He is having diarrhea since starting Metformin- I told him that was a side effect from the medication.  He said it is not too bad and can deal with that.  His blood sugars are as follows  Friday evening  107  Sat am 140  Sat pm 103  Sun am 149  Sun pm 109  Sun bedtime 80  Mon am 127  Mon pm 102

## 2023-01-18 ENCOUNTER — OFFICE VISIT (OUTPATIENT)
Dept: FAMILY MEDICINE CLINIC | Facility: CLINIC | Age: 33
End: 2023-01-18
Payer: COMMERCIAL

## 2023-01-18 VITALS
TEMPERATURE: 97.7 F | SYSTOLIC BLOOD PRESSURE: 121 MMHG | HEIGHT: 76 IN | RESPIRATION RATE: 14 BRPM | BODY MASS INDEX: 38.36 KG/M2 | OXYGEN SATURATION: 99 % | DIASTOLIC BLOOD PRESSURE: 71 MMHG | WEIGHT: 315 LBS | HEART RATE: 72 BPM

## 2023-01-18 DIAGNOSIS — E11.9 NEW ONSET TYPE 2 DIABETES MELLITUS: ICD-10-CM

## 2023-01-18 DIAGNOSIS — E66.01 CLASS 3 SEVERE OBESITY DUE TO EXCESS CALORIES WITH SERIOUS COMORBIDITY AND BODY MASS INDEX (BMI) OF 40.0 TO 44.9 IN ADULT: Primary | ICD-10-CM

## 2023-01-18 DIAGNOSIS — E78.2 MIXED HYPERLIPIDEMIA: ICD-10-CM

## 2023-01-18 PROCEDURE — 99213 OFFICE O/P EST LOW 20 MIN: CPT | Performed by: FAMILY MEDICINE

## 2023-01-18 NOTE — PROGRESS NOTES
Subjective   Bobby Rodriguez is a 32 y.o. male.     History of Present Illness  Extra visit due to new diagnosis  Diabetes  He presents for his follow-up diabetic visit. He has type 2 diabetes mellitus. His disease course has been improving. There are no hypoglycemic associated symptoms. There are no diabetic associated symptoms. Pertinent negatives for diabetes include no polyphagia and no polyuria. There are no hypoglycemic complications. There are no diabetic complications.        The following portions of the patient's history were reviewed and updated as appropriate: current medications, past family history, past medical history, past social history, past surgical history and problem list.    Family History   Problem Relation Age of Onset   • Diabetes Father    • Hyperlipidemia Father    • Hypertension Father    • Cancer Maternal Grandmother         Pancreatic at 82   • Heart disease Maternal Grandfather    • Cancer Paternal Grandfather         Pancreatic at 73       Social History     Tobacco Use   • Smoking status: Never   • Smokeless tobacco: Current     Types: Chew   Substance Use Topics   • Alcohol use: Yes     Alcohol/week: 23.0 standard drinks     Types: 20 Cans of beer, 3 Standard drinks or equivalent per week   • Drug use: Never       Past Surgical History:   Procedure Laterality Date   • EXCISION MASS LEG  2009    AVM at [facility]    • SPINE SURGERY  2013    Lumbar   • TONSILLECTOMY         Patient Active Problem List   Diagnosis   • Plantar wart of both feet   • Bilateral medial epicondylitis of elbow joint   • Situational stress   • Mixed hyperlipidemia   • Lethargy   • Abnormal liver enzymes   • Anxiety   • Displacement of lumbar intervertebral disc without myelopathy   • Class 3 severe obesity due to excess calories with serious comorbidity in adult (HCC)   • Schmorl's nodes-lumbar region   • Sleep walking   • Hyperkalemia   • Hyperglycemia   • Hypertension   • Encounter for general adult medical  "examination with abnormal findings   • Snoring   • New onset type 2 diabetes mellitus (HCC)       Current Outpatient Medications on File Prior to Visit   Medication Sig Dispense Refill   • atorvastatin (Lipitor) 10 MG tablet Take 1 tablet by mouth Daily. 90 tablet 3   • lisinopril (PRINIVIL,ZESTRIL) 10 MG tablet Take 1 tablet by mouth Daily. 90 tablet 3   • metFORMIN (GLUCOPHAGE) 1000 MG tablet Take 1 tablet by mouth 2 (Two) Times a Day With Meals. 180 tablet 1     No current facility-administered medications on file prior to visit.       No Known Allergies    Review of Systems   Eyes: Negative for visual disturbance.   Endocrine: Negative for polyphagia and polyuria.   Genitourinary: Negative for frequency.   Skin: Negative for skin lesions.   Neurological: Negative for syncope and numbness.       Objective   Visit Vitals  /71 (BP Location: Left arm, Patient Position: Sitting, Cuff Size: Large Adult)   Pulse 72   Temp 97.7 °F (36.5 °C)   Resp 14   Ht 193 cm (76\")   Wt (!) 157 kg (346 lb 3.2 oz)   SpO2 99%   BMI 42.14 kg/m²     Physical Exam  Vitals and nursing note reviewed.   Constitutional:       Appearance: He is well-developed.   HENT:      Head: Normocephalic.   Neck:      Thyroid: No thyromegaly.      Vascular: No carotid bruit.      Trachea: Trachea normal.   Cardiovascular:      Rate and Rhythm: Normal rate and regular rhythm.      Heart sounds: No murmur heard.    No friction rub. No gallop.   Pulmonary:      Effort: Pulmonary effort is normal. No respiratory distress.      Breath sounds: Normal breath sounds. No wheezing.   Chest:      Chest wall: No tenderness.   Musculoskeletal:      Cervical back: Neck supple.   Skin:     General: Skin is dry.      Findings: No rash.      Nails: There is no clubbing.   Neurological:      Mental Status: He is alert and oriented to person, place, and time.   Psychiatric:         Behavior: Behavior is cooperative.           Assessment & Plan .  Problem List Items " Addressed This Visit        High    New onset type 2 diabetes mellitus (HCC)    Current Assessment & Plan     Improving- Reviewed home blood sugar readings- Continue checking blood sugars at home. Patient tolerated Metformin well without side effects. I feel the benefits of the medication outweigh the risks.  Patient was having diarrhea from Metformin but has now improved to 2 times a day.           Relevant Orders    Hemoglobin A1c       Medium    Mixed hyperlipidemia    Current Assessment & Plan     Labs ordered return for shared decision-making         Relevant Orders    Lipid Panel With / Chol / HDL Ratio    Comprehensive Metabolic Panel       Low    Class 3 severe obesity due to excess calories with serious comorbidity in adult (HCC) - Primary    Overview     Diet exercise and wt loss encouraged consequences of obesity discussed

## 2023-02-27 ENCOUNTER — CLINICAL SUPPORT (OUTPATIENT)
Dept: FAMILY MEDICINE CLINIC | Facility: CLINIC | Age: 33
End: 2023-02-27

## 2023-02-27 VITALS
DIASTOLIC BLOOD PRESSURE: 80 MMHG | TEMPERATURE: 98.4 F | HEART RATE: 97 BPM | BODY MASS INDEX: 38.36 KG/M2 | WEIGHT: 315 LBS | RESPIRATION RATE: 18 BRPM | OXYGEN SATURATION: 98 % | HEIGHT: 76 IN | SYSTOLIC BLOOD PRESSURE: 138 MMHG

## 2023-02-27 DIAGNOSIS — Z72.0 TOBACCO USE: ICD-10-CM

## 2023-02-27 DIAGNOSIS — Z02.4 ENCOUNTER FOR CDL (COMMERCIAL DRIVING LICENSE) EXAM: Primary | ICD-10-CM

## 2023-02-27 DIAGNOSIS — E66.01 CLASS 3 SEVERE OBESITY DUE TO EXCESS CALORIES WITH SERIOUS COMORBIDITY AND BODY MASS INDEX (BMI) OF 40.0 TO 44.9 IN ADULT: ICD-10-CM

## 2023-02-27 LAB
BILIRUB BLD-MCNC: NEGATIVE MG/DL
CLARITY, POC: CLEAR
COLOR UR: YELLOW
GLUCOSE UR STRIP-MCNC: NEGATIVE MG/DL
KETONES UR QL: NEGATIVE
LEUKOCYTE EST, POC: NEGATIVE
NITRITE UR-MCNC: NEGATIVE MG/ML
PH UR: 6 [PH] (ref 5–8)
PROT UR STRIP-MCNC: NEGATIVE MG/DL
RBC # UR STRIP: NEGATIVE /UL
SP GR UR: 1.02 (ref 1–1.03)
UROBILINOGEN UR QL: NORMAL

## 2023-02-27 PROCEDURE — 81002 URINALYSIS NONAUTO W/O SCOPE: CPT | Performed by: FAMILY MEDICINE

## 2023-02-27 PROCEDURE — DOTPHY: Performed by: FAMILY MEDICINE

## 2023-04-05 ENCOUNTER — OFFICE VISIT (OUTPATIENT)
Dept: FAMILY MEDICINE CLINIC | Facility: CLINIC | Age: 33
End: 2023-04-05
Payer: COMMERCIAL

## 2023-04-05 VITALS
HEIGHT: 76 IN | DIASTOLIC BLOOD PRESSURE: 72 MMHG | SYSTOLIC BLOOD PRESSURE: 139 MMHG | TEMPERATURE: 97.4 F | WEIGHT: 315 LBS | RESPIRATION RATE: 15 BRPM | OXYGEN SATURATION: 96 % | HEART RATE: 85 BPM | BODY MASS INDEX: 38.36 KG/M2

## 2023-04-05 DIAGNOSIS — I10 PRIMARY HYPERTENSION: ICD-10-CM

## 2023-04-05 DIAGNOSIS — E78.2 MIXED HYPERLIPIDEMIA: ICD-10-CM

## 2023-04-05 DIAGNOSIS — E66.01 CLASS 3 SEVERE OBESITY DUE TO EXCESS CALORIES WITH SERIOUS COMORBIDITY AND BODY MASS INDEX (BMI) OF 40.0 TO 44.9 IN ADULT: Primary | ICD-10-CM

## 2023-04-05 DIAGNOSIS — E11.9 NEW ONSET TYPE 2 DIABETES MELLITUS: ICD-10-CM

## 2023-04-05 DIAGNOSIS — E87.5 HYPERKALEMIA: ICD-10-CM

## 2023-04-05 DIAGNOSIS — R74.8 ABNORMAL LIVER ENZYMES: ICD-10-CM

## 2023-04-05 LAB
ALBUMIN SERPL-MCNC: 4.4 G/DL (ref 4.1–5.2)
ALBUMIN/GLOB SERPL: 1.5 {RATIO} (ref 1.2–2.2)
ALP SERPL-CCNC: 72 IU/L (ref 44–121)
ALT SERPL-CCNC: 66 IU/L (ref 0–44)
AST SERPL-CCNC: 35 IU/L (ref 0–40)
BILIRUB SERPL-MCNC: 0.5 MG/DL (ref 0–1.2)
BUN SERPL-MCNC: 19 MG/DL (ref 6–20)
BUN/CREAT SERPL: 15 (ref 9–20)
CALCIUM SERPL-MCNC: 9.5 MG/DL (ref 8.7–10.2)
CHLORIDE SERPL-SCNC: 102 MMOL/L (ref 96–106)
CHOLEST SERPL-MCNC: 152 MG/DL (ref 100–199)
CHOLEST/HDLC SERPL: 4.2 RATIO (ref 0–5)
CO2 SERPL-SCNC: 24 MMOL/L (ref 20–29)
CREAT SERPL-MCNC: 1.28 MG/DL (ref 0.76–1.27)
EGFRCR SERPLBLD CKD-EPI 2021: 77 ML/MIN/1.73
GLOBULIN SER CALC-MCNC: 2.9 G/DL (ref 1.5–4.5)
GLUCOSE SERPL-MCNC: 114 MG/DL (ref 70–99)
HBA1C MFR BLD: 6.4 % (ref 4.8–5.6)
HDLC SERPL-MCNC: 36 MG/DL
LDLC SERPL CALC-MCNC: 82 MG/DL (ref 0–99)
POTASSIUM SERPL-SCNC: 5.4 MMOL/L (ref 3.5–5.2)
PROT SERPL-MCNC: 7.3 G/DL (ref 6–8.5)
SODIUM SERPL-SCNC: 139 MMOL/L (ref 134–144)
TRIGL SERPL-MCNC: 201 MG/DL (ref 0–149)
VLDLC SERPL CALC-MCNC: 34 MG/DL (ref 5–40)

## 2023-04-05 RX ORDER — METFORMIN HYDROCHLORIDE EXTENDED-RELEASE TABLETS 1000 MG/1
1000 TABLET, FILM COATED, EXTENDED RELEASE ORAL 2 TIMES DAILY WITH MEALS
Qty: 60 TABLET | Refills: 5 | Status: SHIPPED | OUTPATIENT
Start: 2023-04-05 | End: 2023-04-05 | Stop reason: SDUPTHER

## 2023-04-05 NOTE — PROGRESS NOTES
Subjective   Bobby Rodriguez is a 32 y.o. male.     Hypertension  This is a chronic problem. The current episode started more than 1 year ago. The problem has been gradually improving since onset. The problem is controlled. Pertinent negatives include no chest pain, palpitations or shortness of breath.   Hyperlipidemia  This is a chronic problem. The current episode started more than 1 year ago. The problem is controlled. Pertinent negatives include no chest pain, myalgias or shortness of breath. Current antihyperlipidemic treatment includes statins.   Diabetes  He presents for his follow-up diabetic visit. He has type 2 diabetes mellitus. His disease course has been improving. Pertinent negatives for diabetes include no chest pain, no fatigue, no polyphagia, no polyuria and no weight loss.        The following portions of the patient's history were reviewed and updated as appropriate: current medications, past family history, past medical history, past social history, past surgical history and problem list.    Family History   Problem Relation Age of Onset   • Diabetes Father    • Hyperlipidemia Father    • Hypertension Father    • Cancer Maternal Grandmother         Pancreatic at 82   • Heart disease Maternal Grandfather    • Cancer Paternal Grandfather         Pancreatic at 73       Social History     Tobacco Use   • Smoking status: Never   • Smokeless tobacco: Current     Types: Chew   Vaping Use   • Vaping Use: Never used   Substance Use Topics   • Alcohol use: Yes     Alcohol/week: 23.0 standard drinks     Types: 20 Cans of beer, 3 Standard drinks or equivalent per week   • Drug use: Never       Past Surgical History:   Procedure Laterality Date   • EXCISION MASS LEG  2009    AVM at [facility]    • SPINE SURGERY  2013    Lumbar   • TONSILLECTOMY         Patient Active Problem List   Diagnosis   • Plantar wart of both feet   • Bilateral medial epicondylitis of elbow joint   • Situational stress   • Mixed  "hyperlipidemia   • Lethargy   • Abnormal liver enzymes   • Anxiety   • Displacement of lumbar intervertebral disc without myelopathy   • Class 3 severe obesity due to excess calories with serious comorbidity and body mass index (BMI) of 40.0 to 44.9 in adult   • Schmorl's nodes-lumbar region   • Sleep walking   • Hyperkalemia   • Hyperglycemia   • Hypertension   • Encounter for general adult medical examination with abnormal findings   • Snoring   • New onset type 2 diabetes mellitus       Current Outpatient Medications on File Prior to Visit   Medication Sig Dispense Refill   • atorvastatin (Lipitor) 10 MG tablet Take 1 tablet by mouth Daily. 90 tablet 3   • lisinopril (PRINIVIL,ZESTRIL) 10 MG tablet Take 1 tablet by mouth Daily. 90 tablet 3   • metFORMIN (GLUCOPHAGE) 1000 MG tablet Take 1 tablet by mouth 2 (Two) Times a Day With Meals. 180 tablet 1     No current facility-administered medications on file prior to visit.       No Known Allergies    Review of Systems   Constitutional: Negative for fatigue, unexpected weight gain and unexpected weight loss.   Eyes: Negative for visual disturbance.   Respiratory: Negative for shortness of breath.    Cardiovascular: Negative for chest pain, palpitations and leg swelling.   Gastrointestinal: Negative for nausea.   Endocrine: Negative for polyphagia and polyuria.   Genitourinary: Negative for frequency.   Musculoskeletal: Negative for myalgias.   Skin: Negative for dry skin and skin lesions.   Neurological: Negative for syncope, numbness and headache.       Objective   Visit Vitals  /72 (BP Location: Right arm, Patient Position: Sitting, Cuff Size: Large Adult)   Pulse 85   Temp 97.4 °F (36.3 °C)   Resp 15   Ht 193 cm (76\")   Wt (!) 156 kg (343 lb)   SpO2 96%   BMI 41.75 kg/m²     Physical Exam  Vitals and nursing note reviewed.   Constitutional:       Appearance: He is well-developed.   HENT:      Head: Normocephalic.   Neck:      Thyroid: No thyromegaly.      " Vascular: No carotid bruit.      Trachea: Trachea normal.   Cardiovascular:      Rate and Rhythm: Normal rate and regular rhythm.      Heart sounds: No murmur heard.    No friction rub. No gallop.   Pulmonary:      Effort: Pulmonary effort is normal. No respiratory distress.      Breath sounds: Normal breath sounds. No wheezing.   Chest:      Chest wall: No tenderness.   Musculoskeletal:      Cervical back: Neck supple.   Skin:     General: Skin is dry.      Findings: No rash.      Nails: There is no clubbing.   Neurological:      Mental Status: He is alert and oriented to person, place, and time.   Psychiatric:         Behavior: Behavior is cooperative.           Assessment & Plan .  Problem List Items Addressed This Visit        High    New onset type 2 diabetes mellitus    Current Assessment & Plan     Improved,   Encouraged to watch sugar intake, exercise more and lose weight. compliant with medication. Patient is only taking 1 Metformin a day due to diarrhea when taking 2 a day.  Patient to adjust dosage of Metformin as diarrhea allows.  monitoring sugar at home.   Follow up in 3 months  Care management needs are self-addressed.  Self-management abilities addressed and patient is capable of managing his own disease.  Discussed changing to Metformin ER-patient prefers to stay on current medication.         Relevant Orders    Hemoglobin A1c       Medium    Abnormal liver enzymes    Current Assessment & Plan     Improving due to decrease alcohol intake and weight loss; ALT 66 down from 75, AST 35 down from 40         Hypertension    Current Assessment & Plan     Doing well; --Encouraged to watch salt, exercise more and lose weight.  Patient tolerated lisinopril well without side effects. I feel the benefits of the medication outweigh the risks.           Mixed hyperlipidemia    Current Assessment & Plan     Improved. Close to goal  Encouraged to watch fatty intake, exercise more, and lose weight. compliant with  medication  Patient tolerated Atorvastatin well without side effects. I feel the benefits of the medication outweigh the risks.    Is not getting adequate diet and exercise  Goals developed at last visit were met   Follow up in 3   months  Care management needs are self-addressed.Self-management abilities addressed and patient is capable of managing his own disease.           Relevant Orders    Lipid Panel With / Chol / HDL Ratio    Comprehensive Metabolic Panel       Low    Class 3 severe obesity due to excess calories with serious comorbidity and body mass index (BMI) of 40.0 to 44.9 in adult - Primary    Overview     Diet exercise and wt loss encouraged consequences of obesity discussed          Current Assessment & Plan     Patient's (Body mass index is 41.75 kg/m².) indicates that they are morbidly/severely obese (BMI > 40 or > 35 with obesity - related health condition) with health conditions that include diabetes mellitus . Weight is improving with treatment. BMI  is above average; BMI management plan is completed. We discussed low calorie, low carb based diet program, portion control and increasing exercise.          Hyperkalemia    Current Assessment & Plan     Stable- Potassium 5.4. Advised to avoid bananas and tomatoes.

## 2023-04-05 NOTE — ASSESSMENT & PLAN NOTE
Improved. Close to goal  Encouraged to watch fatty intake, exercise more, and lose weight. compliant with medication  Patient tolerated Atorvastatin well without side effects. I feel the benefits of the medication outweigh the risks.    Is not getting adequate diet and exercise  Goals developed at last visit were met   Follow up in 3   months  Care management needs are self-addressed.Self-management abilities addressed and patient is capable of managing his own disease.

## 2023-04-05 NOTE — ASSESSMENT & PLAN NOTE
Improved,   Encouraged to watch sugar intake, exercise more and lose weight. compliant with medication. Patient is only taking 1 Metformin a day due to diarrhea when taking 2 a day.  Patient to adjust dosage of Metformin as diarrhea allows.  monitoring sugar at home.   Follow up in 3 months  Care management needs are self-addressed.  Self-management abilities addressed and patient is capable of managing his own disease.  Discussed changing to Metformin ER-patient prefers to stay on current medication.

## 2023-04-05 NOTE — ASSESSMENT & PLAN NOTE
Patient's (Body mass index is 41.75 kg/m².) indicates that they are morbidly/severely obese (BMI > 40 or > 35 with obesity - related health condition) with health conditions that include diabetes mellitus . Weight is improving with treatment. BMI  is above average; BMI management plan is completed. We discussed low calorie, low carb based diet program, portion control and increasing exercise.

## 2023-04-05 NOTE — ASSESSMENT & PLAN NOTE
Doing well; --Encouraged to watch salt, exercise more and lose weight.  Patient tolerated lisinopril well without side effects. I feel the benefits of the medication outweigh the risks.

## 2023-04-05 NOTE — ASSESSMENT & PLAN NOTE
Improving due to decrease alcohol intake and weight loss; ALT 66 down from 75, AST 35 down from 40

## 2023-05-05 ENCOUNTER — OFFICE VISIT (OUTPATIENT)
Dept: FAMILY MEDICINE CLINIC | Facility: CLINIC | Age: 33
End: 2023-05-05
Payer: COMMERCIAL

## 2023-05-05 VITALS
SYSTOLIC BLOOD PRESSURE: 127 MMHG | HEART RATE: 72 BPM | HEIGHT: 76 IN | TEMPERATURE: 97.3 F | DIASTOLIC BLOOD PRESSURE: 87 MMHG | BODY MASS INDEX: 38.36 KG/M2 | RESPIRATION RATE: 20 BRPM | WEIGHT: 315 LBS

## 2023-05-05 DIAGNOSIS — M62.830 BACK MUSCLE SPASM: ICD-10-CM

## 2023-05-05 DIAGNOSIS — M54.41 ACUTE RIGHT-SIDED LOW BACK PAIN WITH RIGHT-SIDED SCIATICA: Primary | ICD-10-CM

## 2023-05-05 RX ORDER — METHYLPREDNISOLONE SODIUM SUCCINATE 125 MG/2ML
125 INJECTION, POWDER, LYOPHILIZED, FOR SOLUTION INTRAMUSCULAR; INTRAVENOUS ONCE
Status: COMPLETED | OUTPATIENT
Start: 2023-05-05 | End: 2023-05-05

## 2023-05-05 RX ORDER — CYCLOBENZAPRINE HCL 10 MG
10 TABLET ORAL EVERY 8 HOURS PRN
Qty: 30 TABLET | Refills: 0 | Status: SHIPPED | OUTPATIENT
Start: 2023-05-05

## 2023-05-05 RX ORDER — KETOROLAC TROMETHAMINE 30 MG/ML
30 INJECTION, SOLUTION INTRAMUSCULAR; INTRAVENOUS ONCE
Status: COMPLETED | OUTPATIENT
Start: 2023-05-05 | End: 2023-05-05

## 2023-05-05 RX ORDER — METHYLPREDNISOLONE 4 MG/1
TABLET ORAL
Qty: 21 TABLET | Refills: 0 | Status: SHIPPED | OUTPATIENT
Start: 2023-05-05

## 2023-05-05 RX ORDER — KETOROLAC TROMETHAMINE 10 MG/1
10 TABLET, FILM COATED ORAL EVERY 6 HOURS
Qty: 20 TABLET | Refills: 0 | Status: SHIPPED | OUTPATIENT
Start: 2023-05-05 | End: 2023-05-10

## 2023-05-05 RX ADMIN — KETOROLAC TROMETHAMINE 30 MG: 30 INJECTION, SOLUTION INTRAMUSCULAR; INTRAVENOUS at 14:38

## 2023-05-05 RX ADMIN — METHYLPREDNISOLONE SODIUM SUCCINATE 125 MG: 125 INJECTION, POWDER, LYOPHILIZED, FOR SOLUTION INTRAMUSCULAR; INTRAVENOUS at 14:38

## 2023-05-05 RX ADMIN — KETOROLAC TROMETHAMINE 30 MG: 30 INJECTION, SOLUTION INTRAMUSCULAR; INTRAVENOUS at 14:37

## 2023-05-05 NOTE — PROGRESS NOTES
Chief Complaint  Leg Pain (Right leg. Started a week ago.  Seen Chiropractor. X Ray shows L5 deteriorated )    Saurav Rosales is a 32 y.o. male  who presents to [facility]     Patient Care Team:  Genaro Nielsen MD as PCP - General     History of Present Illness  Bobby is here today for low back pain    Location: Low back pain on right.  Pain radiates to right buttock and right leg.  Quality: throbbing pain  Severity: severe; rates 7 on scale of 1-10  Duration: for 1 week  Timing: constant pain  Context: He went to the chiropractor Wed 5/3/23.  He had an adjustment of his neck and low back.  Chiropractor did an xray and told him his L5 was deteriorated.  He had a massage yesterday which helped some.  History back surgery in 2013 by Dr. Bell  No recent injury.  He works in construction  Alleviating factors: ibuprofen 800 mg twice daily - mild help.   Aggravating factors: walking, bending  Associated Symptoms: no incontinence, no weakness, + numbness/tingling      Bobby Rodriguez  has a past medical history of Hyperlipidemia and Hypertension.      Review of Systems   Constitutional: Negative for fever.   Genitourinary:        No incontinence   Musculoskeletal: Positive for back pain.   Neurological: Positive for numbness. Negative for weakness.        Family History   Problem Relation Age of Onset   • Diabetes Father    • Hyperlipidemia Father    • Hypertension Father    • Cancer Maternal Grandmother         Pancreatic at 82   • Heart disease Maternal Grandfather    • Cancer Paternal Grandfather         Pancreatic at 73        Past Surgical History:   Procedure Laterality Date   • EXCISION MASS LEG  2009    AVM at [facility]    • SPINE SURGERY  2013    Lumbar; Dr. Bell   • TONSILLECTOMY  07/30/1999    Dr. Hyman        Social History     Socioeconomic History   • Marital status: Single   Tobacco Use   • Smoking status: Never   • Smokeless tobacco: Current     Types: Chew  "  Vaping Use   • Vaping Use: Never used   Substance and Sexual Activity   • Alcohol use: Yes     Alcohol/week: 23.0 standard drinks     Types: 20 Cans of beer, 3 Standard drinks or equivalent per week   • Drug use: Never   • Sexual activity: Yes     Partners: Female     Birth control/protection: Condom, Pill        Immunization History   Administered Date(s) Administered   • DTaP 01/25/1993, 04/06/1993, 07/06/1993, 09/16/1994, 04/16/1998   • Hep B, Adolescent or Pediatric 1992, 01/25/1993, 07/06/1993   • Hib (PRP-T) 01/25/1993, 04/06/1993, 07/06/1993, 09/16/1994   • IPV 01/25/1993, 04/06/1993, 09/16/1994, 04/16/1998   • MMR 09/16/1994, 09/10/2003   • Meningococcal MCV4P (Menactra) 07/14/2006   • Td 08/27/2004   • Tdap 06/29/2022       Objective       Current Outpatient Medications:   •  atorvastatin (Lipitor) 10 MG tablet, Take 1 tablet by mouth Daily., Disp: 90 tablet, Rfl: 3  •  lisinopril (PRINIVIL,ZESTRIL) 10 MG tablet, Take 1 tablet by mouth Daily., Disp: 90 tablet, Rfl: 3  •  metFORMIN (GLUCOPHAGE) 1000 MG tablet, Take 1 tablet by mouth 2 (Two) Times a Day With Meals., Disp: 180 tablet, Rfl: 1  •  cyclobenzaprine (FLEXERIL) 10 MG tablet, Take 1 tablet by mouth Every 8 (Eight) Hours As Needed for Muscle Spasms. Do not drive or operate heavy machinery while taking, Disp: 30 tablet, Rfl: 0  •  ketorolac (TORADOL) 10 MG tablet, Take 1 tablet by mouth Every 6 (Six) Hours for 5 days., Disp: 20 tablet, Rfl: 0  •  methylPREDNISolone (MEDROL) 4 MG dose pack, Take as directed on package instructions., Disp: 21 tablet, Rfl: 0    Vital Signs:      /87   Pulse 72   Temp 97.3 °F (36.3 °C) (Infrared)   Resp 20   Ht 193 cm (75.98\")   Wt (!) 156 kg (344 lb)   BMI 41.89 kg/m²     Vitals:    05/05/23 1306   BP: 127/87   Pulse: 72   Resp: 20   Temp: 97.3 °F (36.3 °C)   TempSrc: Infrared   Weight: (!) 156 kg (344 lb)   Height: 193 cm (75.98\")   PainSc:   7   PainLoc: Leg      Physical Exam  Vitals reviewed. "   Constitutional:       General: He is in acute distress (in pain).      Appearance: Normal appearance. He is obese.   HENT:      Head: Normocephalic and atraumatic.   Eyes:      General: No scleral icterus.     Conjunctiva/sclera: Conjunctivae normal.   Cardiovascular:      Rate and Rhythm: Normal rate and regular rhythm.      Heart sounds: Normal heart sounds.   Pulmonary:      Effort: Pulmonary effort is normal. No respiratory distress.      Breath sounds: Normal breath sounds. No wheezing.   Musculoskeletal:      Lumbar back: Spasms and tenderness present. No swelling. Decreased range of motion.      Right lower leg: No edema.      Left lower leg: No edema.      Comments: Unable to lay down for exam.   Skin:     General: Skin is warm and dry.   Neurological:      Mental Status: He is alert and oriented to person, place, and time.      Sensory: Sensation is intact.      Motor: No weakness.      Deep Tendon Reflexes:      Reflex Scores:       Patellar reflexes are 2+ on the right side and 2+ on the left side.  Psychiatric:         Mood and Affect: Mood normal.        Result Review :                     Assessment and Plan    Diagnoses and all orders for this visit:    1. Acute right-sided low back pain with right-sided sciatica (Primary)  -     ketorolac (TORADOL) 10 MG tablet; Take 1 tablet by mouth Every 6 (Six) Hours for 5 days.  Dispense: 20 tablet; Refill: 0  -     methylPREDNISolone (MEDROL) 4 MG dose pack; Take as directed on package instructions.  Dispense: 21 tablet; Refill: 0  -     methylPREDNISolone sodium succinate (SOLU-Medrol) injection 125 mg  -     ketorolac (TORADOL) injection 30 mg  -     ketorolac (TORADOL) injection 30 mg    2. Back muscle spasm  -     cyclobenzaprine (FLEXERIL) 10 MG tablet; Take 1 tablet by mouth Every 8 (Eight) Hours As Needed for Muscle Spasms. Do not drive or operate heavy machinery while taking  Dispense: 30 tablet; Refill: 0       Solu Medrol 125 IM and Toradol 60 mg IM  given in office. Begin oral Medrol dose pack and oral Toradol tomorrow.  Stop taking over the counter ibuprofen.  May take Tylenol as directed.  Begin Flexeril as needed for spasms.  Follow up in 1-2 weeks for recheck or sooner if needed.      Follow Up   Return in about 2 weeks (around 5/19/2023) for Follow up with your PCP Dr. Nielsen for recheck.  Patient was given instructions and counseling regarding his condition or for health maintenance advice. Please see specific information pulled into the AVS if appropriate.    Patient Instructions   Stop taking ibuprofen.  Start the medrol dose pack and ketorolac tablets tomorrow.

## 2023-05-08 ENCOUNTER — OFFICE VISIT (OUTPATIENT)
Dept: FAMILY MEDICINE CLINIC | Facility: CLINIC | Age: 33
End: 2023-05-08
Payer: COMMERCIAL

## 2023-05-08 VITALS
DIASTOLIC BLOOD PRESSURE: 80 MMHG | SYSTOLIC BLOOD PRESSURE: 137 MMHG | TEMPERATURE: 96.9 F | OXYGEN SATURATION: 98 % | BODY MASS INDEX: 38.36 KG/M2 | WEIGHT: 315 LBS | RESPIRATION RATE: 15 BRPM | HEIGHT: 76 IN | HEART RATE: 82 BPM

## 2023-05-08 DIAGNOSIS — M51.26 DISPLACEMENT OF LUMBAR INTERVERTEBRAL DISC WITHOUT MYELOPATHY: Primary | ICD-10-CM

## 2023-05-08 PROCEDURE — 99213 OFFICE O/P EST LOW 20 MIN: CPT | Performed by: FAMILY MEDICINE

## 2023-05-08 NOTE — PROGRESS NOTES
Subjective   Bobby Rodriguez is a 32 y.o. male.     Back Pain  This is a new problem. The current episode started 1 to 4 weeks ago. The problem occurs constantly. The problem has been gradually worsening since onset. The pain is present in the lumbar spine. The quality of the pain is described as aching. The pain radiates to the right thigh. The pain is moderate. Pertinent negatives include no bladder incontinence.        The following portions of the patient's history were reviewed and updated as appropriate: current medications, past family history, past medical history, past social history, past surgical history and problem list.    Family History   Problem Relation Age of Onset   • Diabetes Father    • Hyperlipidemia Father    • Hypertension Father    • Cancer Maternal Grandmother         Pancreatic at 82   • Heart disease Maternal Grandfather    • Cancer Paternal Grandfather         Pancreatic at 73       Social History     Tobacco Use   • Smoking status: Never   • Smokeless tobacco: Current     Types: Chew   Vaping Use   • Vaping Use: Never used   Substance Use Topics   • Alcohol use: Yes     Alcohol/week: 23.0 standard drinks     Types: 20 Cans of beer, 3 Standard drinks or equivalent per week   • Drug use: Never       Past Surgical History:   Procedure Laterality Date   • EXCISION MASS LEG  2009    [facility]M at [facility]    • SPINE SURGERY  2013    Lumbar; Dr. Bell   • TONSILLECTOMY  07/30/1999    Dr. Hyman       Patient Active Problem List   Diagnosis   • Plantar wart of both feet   • Bilateral medial epicondylitis of elbow joint   • Situational stress   • Mixed hyperlipidemia   • Lethargy   • Abnormal liver enzymes   • Anxiety   • Displacement of lumbar intervertebral disc without myelopathy   • Class 3 severe obesity due to excess calories with serious comorbidity and body mass index (BMI) of 40.0 to 44.9 in adult   • Schmorl's nodes-lumbar region   • Sleep walking   • Hyperkalemia   • Hyperglycemia   •  "Hypertension   • Encounter for general adult medical examination with abnormal findings   • Snoring   • New onset type 2 diabetes mellitus       Current Outpatient Medications on File Prior to Visit   Medication Sig Dispense Refill   • atorvastatin (Lipitor) 10 MG tablet Take 1 tablet by mouth Daily. 90 tablet 3   • cyclobenzaprine (FLEXERIL) 10 MG tablet Take 1 tablet by mouth Every 8 (Eight) Hours As Needed for Muscle Spasms. Do not drive or operate heavy machinery while taking 30 tablet 0   • lisinopril (PRINIVIL,ZESTRIL) 10 MG tablet Take 1 tablet by mouth Daily. 90 tablet 3   • metFORMIN (GLUCOPHAGE) 1000 MG tablet Take 1 tablet by mouth 2 (Two) Times a Day With Meals. 180 tablet 1   • methylPREDNISolone (MEDROL) 4 MG dose pack Take as directed on package instructions. 21 tablet 0     No current facility-administered medications on file prior to visit.       No Known Allergies    Review of Systems   Gastrointestinal: Negative for rectal pain.   Genitourinary: Negative for urinary incontinence.   Musculoskeletal: Positive for back pain.       Objective   Visit Vitals  /80 (BP Location: Left arm, Patient Position: Sitting, Cuff Size: Large Adult)   Pulse 82   Temp 96.9 °F (36.1 °C)   Resp 15   Ht 193 cm (76\")   Wt (!) 151 kg (333 lb 6.4 oz)   SpO2 98%   BMI 40.58 kg/m²     Physical Exam  Vitals and nursing note reviewed.   Constitutional:       Appearance: He is well-developed.   HENT:      Head: Normocephalic.   Neck:      Thyroid: No thyromegaly.      Vascular: No carotid bruit.      Trachea: Trachea normal.   Cardiovascular:      Rate and Rhythm: Normal rate and regular rhythm.      Heart sounds: No murmur heard.    No friction rub. No gallop.   Pulmonary:      Effort: Pulmonary effort is normal. No respiratory distress.      Breath sounds: Normal breath sounds. No wheezing.   Chest:      Chest wall: No tenderness.   Musculoskeletal:      Cervical back: Neck supple.      Lumbar back: Tenderness present. " Decreased range of motion. Positive left straight leg raise test.   Skin:     General: Skin is dry.      Findings: No rash.      Nails: There is no clubbing.   Neurological:      Mental Status: He is alert and oriented to person, place, and time.   Psychiatric:         Behavior: Behavior is cooperative.           Assessment & Plan .  Problem List Items Addressed This Visit        Medium    Displacement of lumbar intervertebral disc without myelopathy - Primary    Overview     Hx of diskectomy,         Current Assessment & Plan      Patient states that he has slightly improved since starting Flexeril, Ketorolac and Steroid pack but still in severe pain. Discussed a referral to Dr. Bell and doing a Ct scan or MRI.  Will send order to [facility] for the test and will be self-pay. Will hold on appt with dr. Bell for now.  Follow up next week.  Benefits of medication outweigh the risk         Relevant Orders    MRI Lumbar Spine Without Contrast

## 2023-05-08 NOTE — ASSESSMENT & PLAN NOTE
Patient states that he has slightly improved since starting Flexeril, Ketorolac and Steroid pack but still in severe pain. Discussed a referral to Dr. Bell and doing a Ct scan or MRI.  Will send order to [facility] for the test and will be self-pay. Will hold on appt with dr. Bell for now.  Follow up next week.  Benefits of medication outweigh the risk

## 2023-05-10 ENCOUNTER — TELEPHONE (OUTPATIENT)
Dept: FAMILY MEDICINE CLINIC | Facility: CLINIC | Age: 33
End: 2023-05-10
Payer: COMMERCIAL

## 2023-05-10 NOTE — TELEPHONE ENCOUNTER
Caller: Bobby Rodriguez    Relationship: Self    Best call back number: [phone]    What is the best time to reach you: ANYTIME    Who are you requesting to speak with (clinical staff, provider,  specific staff member): CLINICAL STAFF    Do you know the name of the person who called: PATIENT     What was the call regarding: PATIENT'S MOM DROPPED OF A DISK THIS MORNING AT THE OFFICE WITH THE PATIENT'S MRI RESULTS     HE IS WANTING TO SEE IF DR. TERRAZAS HAS REVIEWED IT YET     Do you require a callback: YES

## 2023-05-10 NOTE — TELEPHONE ENCOUNTER
Called patient and told him I tried to get the report- They are waiting on a study from [facility] to compare to.  Will call patient when we get the results.

## 2023-05-11 ENCOUNTER — TELEPHONE (OUTPATIENT)
Dept: FAMILY MEDICINE CLINIC | Facility: CLINIC | Age: 33
End: 2023-05-11
Payer: COMMERCIAL

## 2023-05-11 DIAGNOSIS — M51.26 DISPLACEMENT OF LUMBAR INTERVERTEBRAL DISC WITHOUT MYELOPATHY: ICD-10-CM

## 2023-05-11 NOTE — TELEPHONE ENCOUNTER
Spoke with Bobby- Advised him the MRI shows LDD.  Patient states that he is feeling better.  I advised him he could go to Pain management for epidural injections, see Dr. Bell or wait and give it time to see if it continues to improve.  Bobby wants to give it some time.

## 2023-05-15 ENCOUNTER — OFFICE VISIT (OUTPATIENT)
Dept: FAMILY MEDICINE CLINIC | Facility: CLINIC | Age: 33
End: 2023-05-15
Payer: COMMERCIAL

## 2023-05-15 VITALS
OXYGEN SATURATION: 98 % | RESPIRATION RATE: 18 BRPM | HEART RATE: 101 BPM | WEIGHT: 315 LBS | TEMPERATURE: 97.3 F | DIASTOLIC BLOOD PRESSURE: 84 MMHG | BODY MASS INDEX: 39.17 KG/M2 | SYSTOLIC BLOOD PRESSURE: 132 MMHG | HEIGHT: 75 IN

## 2023-05-15 DIAGNOSIS — E78.2 MIXED HYPERLIPIDEMIA: ICD-10-CM

## 2023-05-15 DIAGNOSIS — R73.9 HYPERGLYCEMIA: ICD-10-CM

## 2023-05-15 DIAGNOSIS — M51.26 DISPLACEMENT OF LUMBAR INTERVERTEBRAL DISC WITHOUT MYELOPATHY: Primary | ICD-10-CM

## 2023-05-15 DIAGNOSIS — I10 PRIMARY HYPERTENSION: ICD-10-CM

## 2023-05-15 PROCEDURE — 99213 OFFICE O/P EST LOW 20 MIN: CPT | Performed by: FAMILY MEDICINE

## 2023-05-15 RX ORDER — LISINOPRIL 10 MG/1
10 TABLET ORAL DAILY
Qty: 90 TABLET | Refills: 0 | Status: SHIPPED | OUTPATIENT
Start: 2023-05-15

## 2023-05-15 RX ORDER — ATORVASTATIN CALCIUM 10 MG/1
10 TABLET, FILM COATED ORAL DAILY
Qty: 90 TABLET | Refills: 0 | Status: SHIPPED | OUTPATIENT
Start: 2023-05-15

## 2023-05-15 NOTE — ASSESSMENT & PLAN NOTE
Improving, patient stated he has improved to a 1-2 on a pain scale of 1-10.  MRI  done 5-, results reviewed with pt.  Discussed referral to pain management, referral to Dr. Bell and nerve conduction studies.  Patient declines at present.  Discussed Physical therapy, pt. Declines and given back owners manual.  He is off the steroids and very rarely needs the Flexeril benefits and risk discussed

## 2023-05-15 NOTE — PROGRESS NOTES
Subjective   Bobby Rodriguez is a 32 y.o. male.     Back Pain  This is a chronic problem. The current episode started 1 to 4 weeks ago. The problem has been gradually improving since onset. The pain is present in the lumbar spine. Associated symptoms include numbness and paresthesias. Pertinent negatives include no bladder incontinence or fever.        The following portions of the patient's history were reviewed and updated as appropriate: allergies, current medications, past family history, past medical history, past social history, past surgical history and problem list.    Family History   Problem Relation Age of Onset   • Diabetes Father    • Hyperlipidemia Father    • Hypertension Father    • Cancer Maternal Grandmother         Pancreatic at 82   • Heart disease Maternal Grandfather    • Cancer Paternal Grandfather         Pancreatic at 73       Social History     Tobacco Use   • Smoking status: Never   • Smokeless tobacco: Current     Types: Chew   Vaping Use   • Vaping Use: Never used   Substance Use Topics   • Alcohol use: Yes     Alcohol/week: 23.0 standard drinks     Types: 20 Cans of beer, 3 Standard drinks or equivalent per week   • Drug use: Never       Past Surgical History:   Procedure Laterality Date   • EXCISION MASS LEG  2009    AVM at [facility]    • SPINE SURGERY  2013    Lumbar; Dr. Bell   • TONSILLECTOMY  07/30/1999    Dr. Hyman       Patient Active Problem List   Diagnosis   • Plantar wart of both feet   • Bilateral medial epicondylitis of elbow joint   • Situational stress   • Mixed hyperlipidemia   • Lethargy   • Abnormal liver enzymes   • Anxiety   • Displacement of lumbar intervertebral disc without myelopathy   • Class 3 severe obesity due to excess calories with serious comorbidity and body mass index (BMI) of 40.0 to 44.9 in adult   • Schmorl's nodes-lumbar region   • Sleep walking   • Hyperkalemia   • Hyperglycemia   • Hypertension   • Encounter for general adult medical examination  "with abnormal findings   • Snoring   • New onset type 2 diabetes mellitus       Current Outpatient Medications on File Prior to Visit   Medication Sig Dispense Refill   • cyclobenzaprine (FLEXERIL) 10 MG tablet Take 1 tablet by mouth Every 8 (Eight) Hours As Needed for Muscle Spasms. Do not drive or operate heavy machinery while taking (Patient not taking: Reported on 5/15/2023) 30 tablet 0   • methylPREDNISolone (MEDROL) 4 MG dose pack Take as directed on package instructions. (Patient not taking: Reported on 5/15/2023) 21 tablet 0     No current facility-administered medications on file prior to visit.       No Known Allergies    Review of Systems   Constitutional: Negative for chills, diaphoresis, fatigue and fever.   Genitourinary: Negative for urinary incontinence.   Musculoskeletal: Positive for back pain.   Neurological: Positive for numbness and paresthesias.       Objective   Visit Vitals  /84 (BP Location: Left arm, Patient Position: Sitting, Cuff Size: Large Adult)   Pulse 101   Temp 97.3 °F (36.3 °C) (Temporal)   Resp 18   Ht 190.5 cm (75\")   Wt (!) 154 kg (339 lb 3.2 oz)   SpO2 98%   BMI 42.40 kg/m²     Physical Exam  Vitals and nursing note reviewed.   Constitutional:       Appearance: He is well-developed.   HENT:      Head: Normocephalic.   Neck:      Thyroid: No thyromegaly.      Vascular: No carotid bruit.      Trachea: Trachea normal.   Cardiovascular:      Rate and Rhythm: Normal rate and regular rhythm.      Heart sounds: No murmur heard.    No friction rub. No gallop.   Pulmonary:      Effort: Pulmonary effort is normal. No respiratory distress.      Breath sounds: Normal breath sounds. No wheezing.   Chest:      Chest wall: No tenderness.   Musculoskeletal:      Cervical back: Neck supple.      Lumbar back: Tenderness present. Decreased range of motion. Positive left straight leg raise test.      Comments: Improved he no longer needs his steroids and is using the Flexeril very rarely.  " He is off the steroids and very rarely needs the Flexeril.  Benefits and risk discussed   Skin:     General: Skin is dry.      Findings: No rash.      Nails: There is no clubbing.   Neurological:      Mental Status: He is alert and oriented to person, place, and time.   Psychiatric:         Behavior: Behavior is cooperative.           Assessment & Plan .  Problem List Items Addressed This Visit        Medium    Displacement of lumbar intervertebral disc without myelopathy - Primary    Overview     Hx of diskectomy,         Current Assessment & Plan     Improving, patient stated he has improved to a 1-2 on a pain scale of 1-10.  MRI  done 5-, results reviewed with pt.  Discussed referral to pain management, referral to Dr. Bell and nerve conduction studies.  Patient declines at present.  Discussed Physical therapy, pt. Declines and given back owners manual.  He is off the steroids and very rarely needs the Flexeril benefits and risk discussed

## 2023-07-08 LAB
ALBUMIN SERPL-MCNC: 4.6 G/DL (ref 4.1–5.2)
ALBUMIN/GLOB SERPL: 1.8 {RATIO} (ref 1.2–2.2)
ALP SERPL-CCNC: 77 IU/L (ref 44–121)
ALT SERPL-CCNC: 75 IU/L (ref 0–44)
AST SERPL-CCNC: 35 IU/L (ref 0–40)
BILIRUB SERPL-MCNC: 0.6 MG/DL (ref 0–1.2)
BUN SERPL-MCNC: 16 MG/DL (ref 6–20)
BUN/CREAT SERPL: 14 (ref 9–20)
CALCIUM SERPL-MCNC: 9.5 MG/DL (ref 8.7–10.2)
CHLORIDE SERPL-SCNC: 104 MMOL/L (ref 96–106)
CHOLEST SERPL-MCNC: 152 MG/DL (ref 100–199)
CHOLEST/HDLC SERPL: 5.1 RATIO (ref 0–5)
CO2 SERPL-SCNC: 24 MMOL/L (ref 20–29)
CREAT SERPL-MCNC: 1.16 MG/DL (ref 0.76–1.27)
EGFRCR SERPLBLD CKD-EPI 2021: 87 ML/MIN/1.73
GLOBULIN SER CALC-MCNC: 2.6 G/DL (ref 1.5–4.5)
GLUCOSE SERPL-MCNC: 143 MG/DL (ref 70–99)
HBA1C MFR BLD: 7 % (ref 4.8–5.6)
HDLC SERPL-MCNC: 30 MG/DL
LDLC SERPL CALC-MCNC: 77 MG/DL (ref 0–99)
POTASSIUM SERPL-SCNC: 5.2 MMOL/L (ref 3.5–5.2)
PROT SERPL-MCNC: 7.2 G/DL (ref 6–8.5)
SODIUM SERPL-SCNC: 142 MMOL/L (ref 134–144)
TRIGL SERPL-MCNC: 274 MG/DL (ref 0–149)
VLDLC SERPL CALC-MCNC: 45 MG/DL (ref 5–40)

## 2023-09-09 DIAGNOSIS — E78.2 MIXED HYPERLIPIDEMIA: ICD-10-CM

## 2023-09-11 RX ORDER — ATORVASTATIN CALCIUM 10 MG/1
10 TABLET, FILM COATED ORAL DAILY
Qty: 90 TABLET | Refills: 0 | Status: SHIPPED | OUTPATIENT
Start: 2023-09-11

## 2023-10-17 ENCOUNTER — OFFICE VISIT (OUTPATIENT)
Dept: FAMILY MEDICINE CLINIC | Facility: CLINIC | Age: 33
End: 2023-10-17
Payer: COMMERCIAL

## 2023-10-17 VITALS
WEIGHT: 315 LBS | TEMPERATURE: 97.7 F | HEART RATE: 100 BPM | HEIGHT: 75 IN | RESPIRATION RATE: 18 BRPM | DIASTOLIC BLOOD PRESSURE: 95 MMHG | OXYGEN SATURATION: 98 % | BODY MASS INDEX: 39.17 KG/M2 | SYSTOLIC BLOOD PRESSURE: 138 MMHG

## 2023-10-17 DIAGNOSIS — R35.1 NOCTURIA: ICD-10-CM

## 2023-10-17 DIAGNOSIS — I10 PRIMARY HYPERTENSION: Primary | ICD-10-CM

## 2023-10-17 DIAGNOSIS — F41.9 ANXIETY: ICD-10-CM

## 2023-10-17 DIAGNOSIS — Z71.85 IMMUNIZATION COUNSELING: ICD-10-CM

## 2023-10-17 DIAGNOSIS — E11.9 TYPE 2 DIABETES MELLITUS WITHOUT COMPLICATION, WITHOUT LONG-TERM CURRENT USE OF INSULIN: ICD-10-CM

## 2023-10-17 DIAGNOSIS — R53.83 LETHARGY: ICD-10-CM

## 2023-10-17 DIAGNOSIS — E78.2 MIXED HYPERLIPIDEMIA: ICD-10-CM

## 2023-10-17 RX ORDER — CITALOPRAM HYDROBROMIDE 10 MG/1
10 TABLET ORAL DAILY
Qty: 90 TABLET | Refills: 3 | Status: SHIPPED | OUTPATIENT
Start: 2023-10-17

## 2023-10-17 NOTE — PROGRESS NOTES
Subjective   Bobby Rodriguez is a 32 y.o. male.     Hypertension  This is a chronic problem. The current episode started more than 1 year ago. The problem has been gradually worsening since onset. Associated symptoms include anxiety. Pertinent negatives include no chest pain, palpitations or shortness of breath. Risk factors for coronary artery disease include diabetes mellitus, dyslipidemia and obesity. The current treatment provides no improvement.   Anxiety  Presents for follow-up visit. Symptoms include nervous/anxious behavior. Patient reports no chest pain, palpitations or shortness of breath. Symptoms occur most days. The severity of symptoms is mild. The quality of sleep is good. Nighttime awakenings: one to two.            The following portions of the patient's history were reviewed and updated as appropriate: allergies, current medications, past family history, past medical history, past social history, past surgical history, and problem list.    Family History   Problem Relation Age of Onset    Diabetes Father     Hyperlipidemia Father     Hypertension Father     Cancer Maternal Grandmother         Pancreatic at 82    Heart disease Maternal Grandfather     Cancer Paternal Grandfather         Pancreatic at 73       Social History     Tobacco Use    Smoking status: Never    Smokeless tobacco: Current     Types: Chew   Vaping Use    Vaping Use: Never used   Substance Use Topics    Alcohol use: Yes     Alcohol/week: 23.0 standard drinks of alcohol     Types: 20 Cans of beer, 3 Standard drinks or equivalent per week    Drug use: Never       Past Surgical History:   Procedure Laterality Date    EXCISION MASS LEG  2009    [facility]M at [facility]     SPINE SURGERY  2013    Lumbar; Dr. Bell    TONSILLECTOMY  07/30/1999    Dr. Hyman       Patient Active Problem List   Diagnosis    Plantar wart of both feet    Bilateral medial epicondylitis of elbow joint    Situational stress    Mixed hyperlipidemia    Lethargy     "Abnormal liver enzymes    Anxiety    Displacement of lumbar intervertebral disc without myelopathy    Class 3 severe obesity due to excess calories with serious comorbidity and body mass index (BMI) of 40.0 to 44.9 in adult    Schmorl's nodes-lumbar region    Sleep walking    Hyperkalemia    Hyperglycemia    Hypertension    Encounter for general adult medical examination with abnormal findings    Snoring    Type 2 diabetes mellitus without complication, without long-term current use of insulin    Nocturia    Immunization counseling       Current Outpatient Medications on File Prior to Visit   Medication Sig Dispense Refill    atorvastatin (LIPITOR) 10 MG tablet TAKE 1 TABLET BY MOUTH DAILY. 90 tablet 0    lisinopril (PRINIVIL,ZESTRIL) 10 MG tablet Take 1 tablet by mouth Daily. 90 tablet 0    metFORMIN (GLUCOPHAGE) 1000 MG tablet Take 1 tablet by mouth 2 (Two) Times a Day With Meals. 180 tablet 0     No current facility-administered medications on file prior to visit.       No Known Allergies    Review of Systems   Constitutional:  Positive for fatigue.   HENT:  Negative for hearing loss.    Respiratory:  Negative for shortness of breath.    Cardiovascular:  Negative for chest pain and palpitations.   Genitourinary:  Positive for nocturia (1 x nightly). Negative for frequency.        Nocturia   Musculoskeletal:  Negative for joint swelling.   Neurological:  Negative for memory problem.   Psychiatric/Behavioral:  The patient is nervous/anxious.        Objective   Visit Vitals  /95 (BP Location: Left arm, Patient Position: Sitting, Cuff Size: Large Adult)   Pulse 100   Temp 97.7 °F (36.5 °C) (Skin)   Resp 18   Ht 190.5 cm (75\")   Wt (!) 157 kg (345 lb 6.4 oz)   SpO2 98%   BMI 43.17 kg/m²     Physical Exam  Vitals and nursing note reviewed.   Constitutional:       Appearance: He is well-developed.   HENT:      Head: Normocephalic.   Neck:      Thyroid: No thyromegaly.      Vascular: No carotid bruit.      Trachea: " Trachea normal.   Cardiovascular:      Rate and Rhythm: Normal rate and regular rhythm.      Heart sounds: No murmur heard.     No friction rub. No gallop.   Pulmonary:      Effort: Pulmonary effort is normal. No respiratory distress.      Breath sounds: Normal breath sounds. No wheezing.   Chest:      Chest wall: No tenderness.   Musculoskeletal:      Cervical back: Neck supple.   Skin:     General: Skin is dry.      Findings: No rash.      Nails: There is no clubbing.   Neurological:      Mental Status: He is alert and oriented to person, place, and time.   Psychiatric:         Behavior: Behavior is cooperative.           Assessment & Plan .  Problem List Items Addressed This Visit          High    Type 2 diabetes mellitus without complication, without long-term current use of insulin    Current Assessment & Plan     Will order labs today and patient will return for results and shared decision making.           Relevant Orders    Hemoglobin A1c (Completed)    Microalbumin / Creatinine Urine Ratio - Urine, Clean Catch (Completed)       Medium    Anxiety    Current Assessment & Plan     Stable he is okay with trial of medication.  Start Clelexa 10 mg daily.  Benefits and risks of Celexa discussed with pt.  I feel the benefits of Celexa outweigh risks         Relevant Medications    citalopram (CeleXA) 10 MG tablet    Hypertension - Primary    Current Assessment & Plan     Worse.  Patient tolerated Lisinopril well without side effects. I feel the benefits of the medication outweigh the risks. Patient stated he has had a stressful morning and did not take his B/P medication because he is fasting for labs.  Pt. Advised he can take his B.P medication with water when fasting.  EKG done today, read by me, reviewed with pt.  EKG showed NSR with ventrate of 62 and ? Right ventricular conduction delay.  Encouraged to watch salt, exercise more and lose weight.           Relevant Orders    ECG 12 Lead    Mixed hyperlipidemia     Current Assessment & Plan     Will order labs today and patient will return for results and shared decision making.           Relevant Orders    Lipid Panel With / Chol / HDL Ratio (Completed)    Comprehensive Metabolic Panel (Completed)       Unprioritized    Immunization counseling    Overview     T-Dap 6-         Current Assessment & Plan     T-Dap 6-         Lethargy    Current Assessment & Plan     Will order labs today and patient will return for results and shared decision making.           Relevant Orders    CBC & Differential (Completed)    TSH (Completed)    Nocturia    Current Assessment & Plan     Will order labs today and patient will return for results and shared decision making.           Relevant Orders    Urinalysis without microscopic (no culture) - Urine, Clean Catch (Completed)

## 2023-10-17 NOTE — ASSESSMENT & PLAN NOTE
Stable he is okay with trial of medication.  Start Clelexa 10 mg daily.  Benefits and risks of Celexa discussed with pt.  I feel the benefits of Celexa outweigh risks

## 2023-10-17 NOTE — ASSESSMENT & PLAN NOTE
Worse.  Patient tolerated Lisinopril well without side effects. I feel the benefits of the medication outweigh the risks. Patient stated he has had a stressful morning and did not take his B/P medication because he is fasting for labs.  Pt. Advised he can take his B.P medication with water when fasting.  EKG done today, read by me, reviewed with pt.  EKG showed NSR with ventrate of 62 and ? Right ventricular conduction delay.  Encouraged to watch salt, exercise more and lose weight.

## 2023-10-18 LAB
ALBUMIN SERPL-MCNC: 4.9 G/DL (ref 4.3–5.2)
ALBUMIN/CREAT UR: 23 MG/G CREAT (ref 0–29)
ALBUMIN/GLOB SERPL: 1.8 {RATIO} (ref 1.2–2.2)
ALP SERPL-CCNC: 91 IU/L (ref 44–121)
ALT SERPL-CCNC: 107 IU/L (ref 0–44)
APPEARANCE UR: CLEAR
AST SERPL-CCNC: 71 IU/L (ref 0–40)
BASOPHILS # BLD AUTO: 0.1 X10E3/UL (ref 0–0.2)
BASOPHILS NFR BLD AUTO: 1 %
BILIRUB SERPL-MCNC: 0.7 MG/DL (ref 0–1.2)
BILIRUB UR QL STRIP: NEGATIVE
BUN SERPL-MCNC: 16 MG/DL (ref 6–20)
BUN/CREAT SERPL: 15 (ref 9–20)
CALCIUM SERPL-MCNC: 9.9 MG/DL (ref 8.7–10.2)
CHLORIDE SERPL-SCNC: 98 MMOL/L (ref 96–106)
CHOLEST SERPL-MCNC: 176 MG/DL (ref 100–199)
CHOLEST/HDLC SERPL: 5.7 RATIO (ref 0–5)
CO2 SERPL-SCNC: 24 MMOL/L (ref 20–29)
COLOR UR: YELLOW
CREAT SERPL-MCNC: 1.06 MG/DL (ref 0.76–1.27)
CREAT UR-MCNC: 145.6 MG/DL
EGFRCR SERPLBLD CKD-EPI 2021: 97 ML/MIN/1.73
EOSINOPHIL # BLD AUTO: 0.2 X10E3/UL (ref 0–0.4)
EOSINOPHIL NFR BLD AUTO: 2 %
ERYTHROCYTE [DISTWIDTH] IN BLOOD BY AUTOMATED COUNT: 13.4 % (ref 11.6–15.4)
GLOBULIN SER CALC-MCNC: 2.7 G/DL (ref 1.5–4.5)
GLUCOSE SERPL-MCNC: 155 MG/DL (ref 70–99)
GLUCOSE UR QL STRIP: NEGATIVE
HBA1C MFR BLD: 8.7 % (ref 4.8–5.6)
HCT VFR BLD AUTO: 50.2 % (ref 37.5–51)
HDLC SERPL-MCNC: 31 MG/DL
HGB BLD-MCNC: 16.5 G/DL (ref 13–17.7)
HGB UR QL STRIP: NEGATIVE
IMM GRANULOCYTES # BLD AUTO: 0 X10E3/UL (ref 0–0.1)
IMM GRANULOCYTES NFR BLD AUTO: 1 %
KETONES UR QL STRIP: NEGATIVE
LDLC SERPL CALC-MCNC: 83 MG/DL (ref 0–99)
LEUKOCYTE ESTERASE UR QL STRIP: ABNORMAL
LYMPHOCYTES # BLD AUTO: 1.9 X10E3/UL (ref 0.7–3.1)
LYMPHOCYTES NFR BLD AUTO: 26 %
MCH RBC QN AUTO: 29 PG (ref 26.6–33)
MCHC RBC AUTO-ENTMCNC: 32.9 G/DL (ref 31.5–35.7)
MCV RBC AUTO: 88 FL (ref 79–97)
MICROALBUMIN UR-MCNC: 33.4 UG/ML
MONOCYTES # BLD AUTO: 0.5 X10E3/UL (ref 0.1–0.9)
MONOCYTES NFR BLD AUTO: 8 %
NEUTROPHILS # BLD AUTO: 4.4 X10E3/UL (ref 1.4–7)
NEUTROPHILS NFR BLD AUTO: 62 %
NITRITE UR QL STRIP: NEGATIVE
PH UR STRIP: 5.5 [PH] (ref 5–7.5)
PLATELET # BLD AUTO: 219 X10E3/UL (ref 150–450)
POTASSIUM SERPL-SCNC: 5.1 MMOL/L (ref 3.5–5.2)
PROT SERPL-MCNC: 7.6 G/DL (ref 6–8.5)
PROT UR QL STRIP: ABNORMAL
RBC # BLD AUTO: 5.69 X10E6/UL (ref 4.14–5.8)
SODIUM SERPL-SCNC: 138 MMOL/L (ref 134–144)
SP GR UR STRIP: 1.02 (ref 1–1.03)
TRIGL SERPL-MCNC: 378 MG/DL (ref 0–149)
TSH SERPL DL<=0.005 MIU/L-ACNC: 2.04 UIU/ML (ref 0.45–4.5)
UROBILINOGEN UR STRIP-MCNC: 0.2 MG/DL (ref 0.2–1)
VLDLC SERPL CALC-MCNC: 62 MG/DL (ref 5–40)
WBC # BLD AUTO: 7 X10E3/UL (ref 3.4–10.8)

## 2023-10-30 ENCOUNTER — TELEPHONE (OUTPATIENT)
Dept: FAMILY MEDICINE CLINIC | Facility: CLINIC | Age: 33
End: 2023-10-30

## 2023-10-30 PROBLEM — E11.65 UNCONTROLLED TYPE 2 DIABETES MELLITUS WITH HYPERGLYCEMIA: Status: ACTIVE | Noted: 2023-01-05

## 2023-12-02 DIAGNOSIS — E78.2 MIXED HYPERLIPIDEMIA: ICD-10-CM

## 2023-12-02 DIAGNOSIS — I10 PRIMARY HYPERTENSION: ICD-10-CM

## 2023-12-04 NOTE — PROGRESS NOTES
Subjective   Bobby Rodriguez is a 33 y.o. male here for his annual physical with me. Bobby is here for coordination of medical care, to discuss health maintenance, disease prevention as well as to followup on medical problems. Patient has been followed by me since 2004. Patient's last CPE was 8-4-2022. Activity level is moderate. Exercises 0 per week. Appetite is good. Feels well with many complaints. Energy level is good. Sleeps well. Patient's last colonoscopy was never. He is advised to have colonoscopy at age 47. Patient is doing routine self skin exam monthly. Patient is doing routine self-breast exams monthly. Patient is checking testicles monthly.      History of Present Illness  Follow up elevated liver functions.    New numbness of the feet.    Follow up Skin tags.  Diabetes  He presents for his follow-up diabetic visit. He has type 2 diabetes mellitus. The initial diagnosis of diabetes was made 1 year ago. His disease course has been worsening. Pertinent negatives for hypoglycemia include no dizziness, nervousness/anxiousness or speech difficulty. Pertinent negatives for diabetes include no blurred vision, no chest pain, no fatigue, no polydipsia, no polyphagia, no polyuria, no weakness and no weight loss. Risk factors for coronary artery disease include dyslipidemia, diabetes mellitus, obesity and hypertension. He is compliant with treatment none of the time.   Hyperlipidemia  This is a chronic problem. The current episode started more than 1 year ago. The problem is controlled. Recent lipid tests were reviewed and are high. Exacerbating diseases include diabetes. Factors aggravating his hyperlipidemia include fatty foods. Pertinent negatives include no chest pain, myalgias or shortness of breath. Current antihyperlipidemic treatment includes statins. The current treatment provides no improvement of lipids.        The following portions of the patient's history were reviewed and updated as appropriate:  allergies, current medications, past family history, past medical history, past social history, past surgical history, and problem list.    Past Medical History:   Diagnosis Date    Hyperlipidemia     Hypertension        Family History   Problem Relation Age of Onset    Diabetes Father     Hyperlipidemia Father     Hypertension Father     Cancer Maternal Grandmother         Pancreatic at 82    Heart disease Maternal Grandfather     Cancer Paternal Grandfather         Pancreatic at 73       Social History     Socioeconomic History    Marital status: Single   Tobacco Use    Smoking status: Never    Smokeless tobacco: Current     Types: Chew    Tobacco comments:     1 can every 2 weeks   Vaping Use    Vaping Use: Never used   Substance and Sexual Activity    Alcohol use: Yes     Alcohol/week: 23.0 standard drinks of alcohol     Types: 20 Cans of beer, 3 Standard drinks or equivalent per week    Drug use: Never    Sexual activity: Yes     Partners: Female     Birth control/protection: Condom, Pill       Social History     Social History Narrative     1 x .  Son born 7- and 1 baby due 3-2024.   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  Exercise none other than at work.  Caffeine 2 glasses tea daily.  Wears seatbelts 30% of the time.  Hobbies boating, yard work and outdoors.         Past Surgical History:   Procedure Laterality Date    EXCISION MASS LEG  2009    AVM at [facility]     SPINE SURGERY  2013    [facility]; Dr. Bell    TONSILLECTOMY  07/30/1999    Dr. Hyman       Current Outpatient Medications on File Prior to Visit   Medication Sig Dispense Refill    atorvastatin (LIPITOR) 10 MG tablet TAKE 1 TABLET BY MOUTH DAILY. 90 tablet 0     No current facility-administered medications on file prior to visit.       No Known Allergies    Review of Systems   Constitutional:  Negative for appetite change, chills, fatigue, fever, unexpected weight gain and unexpected weight  "loss.   HENT:  Negative for congestion, dental problem, ear discharge, ear pain, hearing loss, nosebleeds, postnasal drip, rhinorrhea, sinus pressure, sneezing, sore throat, tinnitus and voice change.         Last dental exam 8-2023, Dr. Talley-Doing well   Eyes:  Negative for blurred vision, double vision, pain, redness and visual disturbance.        Last vision exam , Dr. Pham-Doing well   Respiratory:  Negative for cough, shortness of breath, wheezing and stridor.    Cardiovascular:  Negative for chest pain, palpitations and leg swelling.   Gastrointestinal:  Negative for abdominal pain, anal bleeding, blood in stool, constipation, diarrhea, nausea, rectal pain, vomiting, GERD and indigestion.        8 BM weekly   Endocrine: Negative for cold intolerance, heat intolerance, polydipsia, polyphagia and polyuria.        Sex [address]  He is a  5  She is a 5   Genitourinary:  Negative for difficulty urinating, dysuria, frequency, hematuria and urgency.   Musculoskeletal:  Negative for back pain, joint swelling, myalgias, neck pain and neck stiffness.   Skin:  Positive for skin lesions. Negative for color change, dry skin and rash.   Neurological:  Positive for numbness. Negative for dizziness, syncope, speech difficulty, weakness, light-headedness, headache and memory problem.   Hematological:  Does not bruise/bleed easily.   Psychiatric/Behavioral:  Negative for decreased concentration, sleep disturbance, depressed mood and stress. The patient is not nervous/anxious.        Objective   Visit Vitals  /82 (BP Location: Left arm, Patient Position: Sitting, Cuff Size: Large Adult)   Pulse 98   Temp 97.1 °F (36.2 °C) (Temporal)   Resp 18   Ht 190.5 cm (75\")   Wt (!) 156 kg (344 lb 3.2 oz)   SpO2 99%   BMI 43.02 kg/m²     Physical Exam  Constitutional:       Appearance: He is well-developed.   HENT:      Head: Normocephalic.      Right Ear: Hearing, tympanic membrane, ear canal and external ear normal.      Left " Ear: Hearing, tympanic membrane, ear canal and external ear normal.      Nose: Nose normal.      Mouth/Throat:      Lips: Pink.      Mouth: Mucous membranes are moist.      Pharynx: Oropharynx is clear. No oropharyngeal exudate.   Eyes:      General: Lids are normal.         Right eye: No discharge.         Left eye: No discharge.      Extraocular Movements: Extraocular movements intact.      Conjunctiva/sclera: Conjunctivae normal.      Pupils: Pupils are equal, round, and reactive to light.   Neck:      Trachea: Trachea normal.   Cardiovascular:      Rate and Rhythm: Normal rate and regular rhythm.      Heart sounds: Normal heart sounds. No murmur heard.  Pulmonary:      Effort: Pulmonary effort is normal. No respiratory distress.      Breath sounds: Normal breath sounds. No stridor. No wheezing or rales.   Chest:      Chest wall: No tenderness.   Breasts:     Right: Normal. No swelling, bleeding, inverted nipple, mass, nipple discharge, skin change or tenderness.      Left: Normal. No swelling, bleeding, inverted nipple, mass, nipple discharge, skin change or tenderness.   Abdominal:      General: Bowel sounds are normal. There is no distension.      Palpations: Abdomen is soft. There is no mass.      Tenderness: There is no abdominal tenderness. There is no guarding or rebound.      Hernia: No hernia is present.   Genitourinary:     Penis: Normal and circumcised. No tenderness.       Testes: Normal.      Prostate: Normal.      Rectum: Normal. Guaiac result negative.   Musculoskeletal:         General: No tenderness or deformity. Normal range of motion.      Cervical back: Full passive range of motion without pain, normal range of motion and neck supple.   Lymphadenopathy:      Cervical: No cervical adenopathy.   Skin:     General: Skin is warm and dry.      Coloration: Skin is not pale.      Findings: No erythema or rash.      Comments: Skin tags bilateral axillae and neck.  Nevus's scattered over the  back.  Scar lower back.   Neurological:      General: No focal deficit present.      Mental Status: He is alert and oriented to person, place, and time.      Cranial Nerves: Cranial nerves 2-12 are intact. No cranial nerve deficit.      Sensory: Sensation is intact. No sensory deficit.      Motor: Motor function is intact.      Coordination: Coordination is intact. Coordination normal.      Gait: Gait is intact.      Deep Tendon Reflexes: Reflexes normal.   Psychiatric:         Behavior: Behavior normal.         Thought Content: Thought content normal.         Judgment: Judgment normal.         Assessment & Plan   Problem List Items Addressed This Visit          High    Uncontrolled type 2 diabetes mellitus with hyperglycemia - Primary    Overview     12- dx         Current Assessment & Plan     A1c and Microalbumin/Creatinine ratio done 10-, read by me, reviewed with pt.  A1c was 8.7 up from 7.0, Microalbumin/Creatinine ratio was 23  Worsening.  Advised to start Farxiga 5 mg daily and restart Metformin 500 mg 1 daily and work up to 1 BID.  Advised Decxom, pt. Agrees, discussed starting Wegovy, pt. Agrees and order given to pt to research cost.  Encouraged to watch sugar intake, exercise more and lose weight.   Non-compliant with medication.   Not monitoring sugar at home.  Advised to start checking blood sugar and bring in blood sugar diary at follow up.  Follow up in 3-4 weeks  Care management needs are self-addressed. Self-management abilities addressed and patient is capable of managing his own disease.           Relevant Medications    metFORMIN (GLUCOPHAGE) 500 MG tablet    dapagliflozin (Farxiga) 5 MG tablet tablet    Continuous Blood Gluc  (Dexcom G6 ) device    Continuous Blood Gluc Transmit (Dexcom G6 Transmitter) misc    Continuous Blood Gluc Sensor (Dexcom G6 Sensor)    Semaglutide-Weight Management (Wegovy) 0.25 MG/0.5ML solution auto-injector    Other Relevant Orders     Hemoglobin A1c       Medium    Abnormal liver enzymes    Current Assessment & Plan     Worse.  CMP done 10-, read by me, reviewed with pt.  AST was 71 up from 35, ALT was 107 up from 75.  Discussed GI referral and Hepatitis panel.  Advised trial off Lipitor due to elevated liver functions.   Will repeat CMP in 3 months.         Anxiety    Current Assessment & Plan     Doing well.  Patient tolerated Celexa well without side effects. I feel the benefits of the medication outweigh the risks.          Relevant Medications    citalopram (CeleXA) 10 MG tablet    Displacement of lumbar intervertebral disc without myelopathy    Overview     Hx of diskectomy,         Current Assessment & Plan     Doing well.           Hypertension    Current Assessment & Plan     Borderline good control.  Patient tolerated Lisinopril.   well without side effects. I feel the benefits of the medication outweigh the risks. EKG done 10-, read by me, reviewed with pt.  EKG showed NSR with vent rate of 62 and questionable right ventricular conduction delay         Relevant Medications    lisinopril (PRINIVIL,ZESTRIL) 10 MG tablet    Mixed hyperlipidemia    Current Assessment & Plan     Lipid and CMP done 10-, read by me, reviewed with pt.  Trig. 378 up from 274, Tot. Chol. 176 up from 152, HDL 31 up from 30, LDL 83 up from 77  Worsening.   Encouraged to watch fatty intake, exercise more, and lose weight.   Compliant with medication. Advised trial off Lipitor due to elevated liver functions.   Is not getting adequate diet and exercise  Goals developed at last visit were not met  Follow up in  2 months  Care management needs are self-addressed.Self-management abilities addressed and patient is capable of managing his own disease.           Relevant Orders    Lipid Panel With / Chol / HDL Ratio    Comprehensive Metabolic Panel       Low    Class 3 severe obesity due to excess calories with serious comorbidity and body mass index  (BMI) of 40.0 to 44.9 in adult    Overview     Diet exercise and wt loss encouraged consequences of obesity discussed          Current Assessment & Plan     Patient's (Body mass index is 43.02 kg/m².) indicates that they are morbidly/severely obese (BMI > 40 or > 35 with obesity - related health condition) with health conditions that include hypertension, diabetes mellitus, and dyslipidemias . Weight is improving with lifestyle modifications. BMI  is above average; BMI management plan is completed. We discussed portion control and increasing exercise.          Hyperglycemia    Hyperkalemia    Overview     Resolved x 2 as of 10-17-23         Current Assessment & Plan     Resolved x2.  CMP done 10-, read by me, reviewed with pt.  Potassium was 5.1 down from 5.2.  Will repeat with next labs.         Sleep walking    Current Assessment & Plan     Intermittent and mild, pt. Declines treatment.            Unprioritized    Encounter for general adult medical examination with abnormal findings    Current Assessment & Plan     Encouraged to do self-breast exam, self-testicle exams, and self derm exams. Congratulated on using seat belts.  Encouraged to do annual physical exams.  Immunization status reviewed.           Immunization counseling    Overview     T-Dap 6-         Lethargy    Current Assessment & Plan     Mild.  CBC and TSH done 10-, read by me, reviewed with pt.  CBC and TSH was normal         Nocturia    Current Assessment & Plan     Stable.  U/A done 10-, read by me, reviewed with pt.  U/A showed trace Leukocytes         Paresthesia    Current Assessment & Plan     New dx.  Discussed work up and treatment, pt. Declines work up and treatment.         Schmorl's nodes-lumbar region    Current Assessment & Plan     Improved.           Situational stress    Current Assessment & Plan     Improved.  Patient tolerated Celexa well without side effects. I feel the benefits of the medication  outweigh the risks.          Skin tags, multiple acquired    Current Assessment & Plan     New dx.  Pt. Requested removal and scheduled for 1-         Snoring    Current Assessment & Plan     Improved, discussed C-pap, pt. Declines.          Other Visit Diagnoses       Bilateral medial epicondylitis of elbow joint        Resolved, thus delete.    Plantar wart of both feet        Resolved, thus delete                 Encouraged to check his skin, testicles and breasts monthly. Reviewed exercising regularly, eating a balanced diet, immunizations and if due, patient counselled to check with insurance company for coverage;, and regularly checking skin and breasts.

## 2023-12-05 ENCOUNTER — OFFICE VISIT (OUTPATIENT)
Dept: FAMILY MEDICINE CLINIC | Facility: CLINIC | Age: 33
End: 2023-12-05
Payer: COMMERCIAL

## 2023-12-05 VITALS
BODY MASS INDEX: 39.17 KG/M2 | OXYGEN SATURATION: 99 % | TEMPERATURE: 97.1 F | DIASTOLIC BLOOD PRESSURE: 82 MMHG | HEIGHT: 75 IN | HEART RATE: 98 BPM | RESPIRATION RATE: 18 BRPM | WEIGHT: 315 LBS | SYSTOLIC BLOOD PRESSURE: 140 MMHG

## 2023-12-05 DIAGNOSIS — E78.2 MIXED HYPERLIPIDEMIA: ICD-10-CM

## 2023-12-05 DIAGNOSIS — R53.83 LETHARGY: ICD-10-CM

## 2023-12-05 DIAGNOSIS — F43.9 SITUATIONAL STRESS: ICD-10-CM

## 2023-12-05 DIAGNOSIS — I10 PRIMARY HYPERTENSION: ICD-10-CM

## 2023-12-05 DIAGNOSIS — M51.26 DISPLACEMENT OF LUMBAR INTERVERTEBRAL DISC WITHOUT MYELOPATHY: ICD-10-CM

## 2023-12-05 DIAGNOSIS — R06.83 SNORING: ICD-10-CM

## 2023-12-05 DIAGNOSIS — E11.65 UNCONTROLLED TYPE 2 DIABETES MELLITUS WITH HYPERGLYCEMIA: Primary | ICD-10-CM

## 2023-12-05 DIAGNOSIS — R35.1 NOCTURIA: ICD-10-CM

## 2023-12-05 DIAGNOSIS — M51.46: ICD-10-CM

## 2023-12-05 DIAGNOSIS — L91.8 SKIN TAGS, MULTIPLE ACQUIRED: ICD-10-CM

## 2023-12-05 DIAGNOSIS — M77.02 BILATERAL MEDIAL EPICONDYLITIS OF ELBOW JOINT: ICD-10-CM

## 2023-12-05 DIAGNOSIS — B07.0 PLANTAR WART OF BOTH FEET: ICD-10-CM

## 2023-12-05 DIAGNOSIS — F41.9 ANXIETY: ICD-10-CM

## 2023-12-05 DIAGNOSIS — R73.9 HYPERGLYCEMIA: ICD-10-CM

## 2023-12-05 DIAGNOSIS — M77.01 BILATERAL MEDIAL EPICONDYLITIS OF ELBOW JOINT: ICD-10-CM

## 2023-12-05 DIAGNOSIS — Z00.01 ENCOUNTER FOR GENERAL ADULT MEDICAL EXAMINATION WITH ABNORMAL FINDINGS: ICD-10-CM

## 2023-12-05 DIAGNOSIS — E66.01 CLASS 3 SEVERE OBESITY DUE TO EXCESS CALORIES WITH SERIOUS COMORBIDITY AND BODY MASS INDEX (BMI) OF 40.0 TO 44.9 IN ADULT: ICD-10-CM

## 2023-12-05 DIAGNOSIS — R74.8 ABNORMAL LIVER ENZYMES: ICD-10-CM

## 2023-12-05 DIAGNOSIS — E87.5 HYPERKALEMIA: ICD-10-CM

## 2023-12-05 DIAGNOSIS — R20.2 PARESTHESIA: ICD-10-CM

## 2023-12-05 DIAGNOSIS — F51.3 SLEEP WALKING: ICD-10-CM

## 2023-12-05 DIAGNOSIS — Z71.85 IMMUNIZATION COUNSELING: ICD-10-CM

## 2023-12-05 RX ORDER — PROCHLORPERAZINE 25 MG/1
1 SUPPOSITORY RECTAL
Qty: 1 EACH | Refills: 2 | Status: SHIPPED | OUTPATIENT
Start: 2023-12-05

## 2023-12-05 RX ORDER — LISINOPRIL 10 MG/1
10 TABLET ORAL DAILY
Qty: 90 TABLET | Refills: 4 | Status: SHIPPED | OUTPATIENT
Start: 2023-12-05

## 2023-12-05 RX ORDER — CITALOPRAM HYDROBROMIDE 10 MG/1
10 TABLET ORAL DAILY
Qty: 90 TABLET | Refills: 4 | Status: SHIPPED | OUTPATIENT
Start: 2023-12-05

## 2023-12-05 RX ORDER — DAPAGLIFLOZIN 5 MG/1
5 TABLET, FILM COATED ORAL DAILY
Qty: 90 TABLET | Refills: 4 | Status: SHIPPED | OUTPATIENT
Start: 2023-12-05

## 2023-12-05 RX ORDER — ATORVASTATIN CALCIUM 10 MG/1
10 TABLET, FILM COATED ORAL DAILY
Qty: 90 TABLET | Refills: 0 | OUTPATIENT
Start: 2023-12-05

## 2023-12-05 RX ORDER — SEMAGLUTIDE 0.25 MG/.5ML
0.25 INJECTION, SOLUTION SUBCUTANEOUS WEEKLY
Qty: 2 ML | Refills: 13 | Status: SHIPPED | OUTPATIENT
Start: 2023-12-05

## 2023-12-05 RX ORDER — PROCHLORPERAZINE 25 MG/1
1 SUPPOSITORY RECTAL DAILY
Qty: 1 EACH | Refills: 0 | Status: SHIPPED | OUTPATIENT
Start: 2023-12-05

## 2023-12-05 RX ORDER — LISINOPRIL 10 MG/1
10 TABLET ORAL DAILY
Qty: 90 TABLET | Refills: 0 | OUTPATIENT
Start: 2023-12-05

## 2023-12-05 RX ORDER — PROCHLORPERAZINE 25 MG/1
SUPPOSITORY RECTAL
Qty: 3 EACH | Refills: 11 | Status: SHIPPED | OUTPATIENT
Start: 2023-12-05

## 2023-12-05 NOTE — ASSESSMENT & PLAN NOTE
Resolved x2.  CMP done 10-, read by me, reviewed with pt.  Potassium was 5.1 down from 5.2.  Will repeat with next labs.

## 2023-12-05 NOTE — ASSESSMENT & PLAN NOTE
Patient's (Body mass index is 43.02 kg/m².) indicates that they are morbidly/severely obese (BMI > 40 or > 35 with obesity - related health condition) with health conditions that include hypertension, diabetes mellitus, and dyslipidemias . Weight is improving with lifestyle modifications. BMI  is above average; BMI management plan is completed. We discussed portion control and increasing exercise.

## 2023-12-05 NOTE — ASSESSMENT & PLAN NOTE
A1c and Microalbumin/Creatinine ratio done 10-, read by me, reviewed with pt.  A1c was 8.7 up from 7.0, Microalbumin/Creatinine ratio was 23 which is normal  Worsening.  Advised to start Farxiga 5 mg daily and restart Metformin 500 mg 1 daily and work up to 1 BID.  Advised Decxom, pt. Agrees, discussed starting Wegovy, pt. Agrees and order given to pt to research cost.  Encouraged to watch sugar intake, exercise more and lose weight.   Non-compliant with medication wake up due to diarrhea from metformin.   Not monitoring sugar at home.  Advised to start checking blood sugar and bring in blood sugar diary at follow up.  Follow up in 3-4 weeks  Care management needs are self-addressed. Self-management abilities addressed and patient is capable of managing his own disease.

## 2023-12-05 NOTE — ASSESSMENT & PLAN NOTE
Improved.  Patient tolerated Celexa well without side effects. I feel the benefits of the medication outweigh the risks.

## 2023-12-05 NOTE — ASSESSMENT & PLAN NOTE
Lipid and CMP done 10-, read by me, reviewed with pt.  Trig. 378 up from 274, Tot. Chol. 176 up from 152, HDL 31 up from 30, LDL 83 up from 77  Worsening.   Encouraged to watch fatty intake, exercise more, and lose weight.   Compliant with medication. Advised trial off Lipitor due to elevated liver functions.   Is not getting adequate diet and exercise  Goals developed at last visit were not met  Follow up in  2 months  Care management needs are self-addressed.Self-management abilities addressed and patient is capable of managing his own disease.

## 2023-12-05 NOTE — ASSESSMENT & PLAN NOTE
Borderline good control.  Patient tolerated Lisinopril.   well without side effects. I feel the benefits of the medication outweigh the risks. EKG done 10-, read by me, reviewed with pt.  EKG showed NSR with vent rate of 62 and questionable right ventricular conduction delay

## 2024-01-02 NOTE — PROGRESS NOTES
Subjective   Bobby Rodriguez is a 33 y.o. male.   Chief Complaint   Patient presents with    Diabetes    Hypertension     History of Present Illness  Extra visit due to poor controled diabetes.  Diabetes  He presents for his follow-up diabetic visit. He has type 2 diabetes mellitus. His disease course has been stable. Pertinent negatives for diabetes include no chest pain, no fatigue, no polyphagia, no polyuria and no weight loss. Current diabetic treatment includes oral agent (dual therapy).   Hypertension  This is a chronic problem. The current episode started more than 1 year ago. The problem is unchanged. The problem is controlled. Pertinent negatives include no chest pain, palpitations or shortness of breath.        The following portions of the patient's history were reviewed and updated as appropriate: allergies, current medications, past family history, past medical history, past social history, past surgical history, and problem list.    Past Medical History:   Diagnosis Date    Hyperlipidemia     Hypertension        Past Surgical History:   Procedure Laterality Date    EXCISION MASS LEG  2009    AVM at [facility]     SPINE SURGERY  2013    [facility]; Dr. Bell    TONSILLECTOMY  07/30/1999    Dr. Hyman        Family History   Problem Relation Age of Onset    Diabetes Father     Hyperlipidemia Father     Hypertension Father     Cancer Maternal Grandmother         Pancreatic at 82    Heart disease Maternal Grandfather     Cancer Paternal Grandfather         Pancreatic at 73        Social History     Socioeconomic History    Marital status: Single   Tobacco Use    Smoking status: Never    Smokeless tobacco: Current     Types: Chew    Tobacco comments:     1 can every 2 weeks   Vaping Use    Vaping Use: Never used   Substance and Sexual Activity    Alcohol use: Yes     Alcohol/week: 23.0 standard drinks of alcohol     Types: 20 Cans of beer, 3 Standard drinks or equivalent per week    Drug use: Never    Sexual  "activity: Yes     Partners: Female     Birth control/protection: Condom, Pill         Review of Systems   Constitutional:  Negative for fatigue, unexpected weight gain and unexpected weight loss.   Eyes:  Negative for visual disturbance.   Respiratory:  Negative for shortness of breath.    Cardiovascular:  Negative for chest pain, palpitations and leg swelling.   Endocrine: Negative for polyphagia and polyuria.   Genitourinary:  Negative for frequency.   Skin:  Negative for skin lesions.   Neurological:  Negative for syncope, numbness and headache.       Objective   Visit Vitals  /80 (BP Location: Left arm, Patient Position: Sitting, Cuff Size: Large Adult)   Pulse 91   Temp 97.9 °F (36.6 °C) (Temporal)   Resp 18   Ht 190.5 cm (75\")   Wt (!) 156 kg (343 lb 6.4 oz)   SpO2 99%   BMI 42.92 kg/m²     Physical Exam  Vitals and nursing note reviewed.   Constitutional:       Appearance: He is well-developed.   HENT:      Head: Normocephalic.   Neck:      Thyroid: No thyromegaly.      Vascular: No carotid bruit.      Trachea: Trachea normal.   Cardiovascular:      Rate and Rhythm: Normal rate and regular rhythm.      Heart sounds: No murmur heard.     No friction rub. No gallop.   Pulmonary:      Effort: Pulmonary effort is normal. No respiratory distress.      Breath sounds: Normal breath sounds. No wheezing.   Chest:      Chest wall: No tenderness.   Musculoskeletal:      Cervical back: Neck supple.   Skin:     General: Skin is dry.      Findings: No rash.      Nails: There is no clubbing.   Neurological:      Mental Status: He is alert and oriented to person, place, and time.   Psychiatric:         Behavior: Behavior is cooperative.         Assessment & Plan   Problem List Items Addressed This Visit          High    Uncontrolled type 2 diabetes mellitus with hyperglycemia - Primary    Overview     12- dx  Can only tolerate 1 Metformin 500 mg daily         Current Assessment & Plan     --Extra visit due to " poor controled diabetes.  Improving.  Patient tolerated Farxiga causes mild increased Urination, however patient wants to continue treatment and Metformin causes mild diarrhea, pt. Wants to continue Metformin, I feel the benefits of the medications outweigh the risks  Encouraged to watch sugar intake, exercise more, lose weight.  Dexcom and Wegovy denied by insurance, pt. Stated that [facility] stated that if a letter is written by the doctor then the insurance will cover them.  Patient provided with letter and rx to take to [facility].           Relevant Medications    Semaglutide-Weight Management (Wegovy) 0.25 MG/0.5ML solution auto-injector    Continuous Blood Gluc  (Dexcom G6 ) device    Continuous Blood Gluc Sensor (Dexcom G6 Sensor)    Continuous Blood Gluc Transmit (Dexcom G6 Transmitter) misc    dapagliflozin (Farxiga) 5 MG tablet tablet    metFORMIN (GLUCOPHAGE) 500 MG tablet    Other Relevant Orders    Hemoglobin A1c       Medium    Hypertension    Current Assessment & Plan     Improved. Patient tolerated Lisinopril well without side effects. I feel the benefits of the medication outweigh the risks.   Encouraged to watch salt, exercise more and lose weight.           Relevant Medications    lisinopril (PRINIVIL,ZESTRIL) 10 MG tablet    Mixed hyperlipidemia    Current Assessment & Plan     Will order labs today and patient will return for results and shared decision making.           Relevant Orders    Lipid Panel With / Chol / HDL Ratio    Comprehensive Metabolic Panel       Low    Class 3 severe obesity due to excess calories with serious comorbidity and body mass index (BMI) of 40.0 to 44.9 in adult    Overview     Diet exercise and wt loss encouraged consequences of obesity discussed          Current Assessment & Plan     Patient's (Body mass index is 42.92 kg/m².) indicates that they are morbidly/severely obese (BMI > 40 or > 35 with obesity - related health condition) with health  conditions that include hypertension, diabetes mellitus, and dyslipidemias . Weight is improving with lifestyle modifications. BMI  is above average; BMI management plan is completed. We discussed portion control and increasing exercise.             Unprioritized    Hypoglycemia    Current Assessment & Plan     New dx.  Advised that patient needs a Dexcom and will benefit by having a Dexcom.  Patient felt light headed and check blood sugar, sugar was 80, probably symptoms due to hypoglycemia         Relevant Medications    Continuous Blood Gluc  (Dexcom G6 ) device    Continuous Blood Gluc Sensor (Dexcom G6 Sensor)    Continuous Blood Gluc Transmit (Dexcom G6 Transmitter) misc

## 2024-01-03 ENCOUNTER — OFFICE VISIT (OUTPATIENT)
Dept: FAMILY MEDICINE CLINIC | Facility: CLINIC | Age: 34
End: 2024-01-03
Payer: COMMERCIAL

## 2024-01-03 VITALS
RESPIRATION RATE: 18 BRPM | SYSTOLIC BLOOD PRESSURE: 132 MMHG | HEIGHT: 75 IN | OXYGEN SATURATION: 99 % | WEIGHT: 315 LBS | DIASTOLIC BLOOD PRESSURE: 80 MMHG | HEART RATE: 91 BPM | BODY MASS INDEX: 39.17 KG/M2 | TEMPERATURE: 97.9 F

## 2024-01-03 DIAGNOSIS — E78.2 MIXED HYPERLIPIDEMIA: ICD-10-CM

## 2024-01-03 DIAGNOSIS — E16.2 HYPOGLYCEMIA: ICD-10-CM

## 2024-01-03 DIAGNOSIS — E66.01 CLASS 3 SEVERE OBESITY DUE TO EXCESS CALORIES WITH SERIOUS COMORBIDITY AND BODY MASS INDEX (BMI) OF 40.0 TO 44.9 IN ADULT: ICD-10-CM

## 2024-01-03 DIAGNOSIS — I10 PRIMARY HYPERTENSION: ICD-10-CM

## 2024-01-03 DIAGNOSIS — E11.65 UNCONTROLLED TYPE 2 DIABETES MELLITUS WITH HYPERGLYCEMIA: Primary | ICD-10-CM

## 2024-01-03 PROCEDURE — 99214 OFFICE O/P EST MOD 30 MIN: CPT | Performed by: FAMILY MEDICINE

## 2024-01-03 RX ORDER — LISINOPRIL 10 MG/1
10 TABLET ORAL DAILY
Start: 2024-01-03

## 2024-01-03 RX ORDER — PROCHLORPERAZINE 25 MG/1
SUPPOSITORY RECTAL
Qty: 3 EACH | Refills: 11 | Status: SHIPPED | OUTPATIENT
Start: 2024-01-03

## 2024-01-03 RX ORDER — DAPAGLIFLOZIN 5 MG/1
5 TABLET, FILM COATED ORAL DAILY
Start: 2024-01-03

## 2024-01-03 RX ORDER — PROCHLORPERAZINE 25 MG/1
1 SUPPOSITORY RECTAL DAILY
Qty: 1 EACH | Refills: 0 | Status: SHIPPED | OUTPATIENT
Start: 2024-01-03

## 2024-01-03 RX ORDER — PROCHLORPERAZINE 25 MG/1
1 SUPPOSITORY RECTAL
Qty: 1 EACH | Refills: 2 | Status: SHIPPED | OUTPATIENT
Start: 2024-01-03

## 2024-01-03 RX ORDER — SEMAGLUTIDE 0.25 MG/.5ML
0.25 INJECTION, SOLUTION SUBCUTANEOUS WEEKLY
Qty: 2 ML | Refills: 13 | Status: SHIPPED | OUTPATIENT
Start: 2024-01-03

## 2024-01-03 NOTE — LETTER
January 3, 2024    Bobby Rodriguez  [address] [place] [address]      Mr. Rodriguez is under my care and has been diagnosed with Uncontrolled Diabetes dx code of E11.65.  He was diagnosed 12- and has since been on treatment of Metformin and Farxiga, patient would greatly benefit by having a Dexcom G6 and starting Wegovy.  His most recent A1c was 8.7 on 10/17/23, previous A1c was 7.0 on 7-7-2023 and prior to that his A1c was 6.4 on 4-4-2023.  Patient has had symptoms of hypoglycemia.                          Genaro Nielsen MD

## 2024-01-03 NOTE — ASSESSMENT & PLAN NOTE
Improved. Patient tolerated Lisinopril well without side effects. I feel the benefits of the medication outweigh the risks.   Encouraged to watch salt, exercise more and lose weight.

## 2024-01-03 NOTE — ASSESSMENT & PLAN NOTE
--Extra visit due to poor controled diabetes.  Improving.  Patient tolerated Farxiga causes mild increased Urination, however patient wants to continue treatment and Metformin causes mild diarrhea, pt. Wants to continue Metformin, I feel the benefits of the medications outweigh the risks  Encouraged to watch sugar intake, exercise more, lose weight.  Dexcom and Wegovy denied by insurance, pt. Stated that [facility] stated that if a letter is written by the doctor then the insurance will cover them.  Patient provided with letter and rx to take to [facility].

## 2024-01-03 NOTE — ASSESSMENT & PLAN NOTE
New dx.  Advised that patient needs a Dexcom and will benefit by having a Dexcom.  Patient felt light headed and check blood sugar, sugar was 80, probably symptoms due to hypoglycemia

## 2024-01-03 NOTE — ASSESSMENT & PLAN NOTE
Patient's (Body mass index is 42.92 kg/m².) indicates that they are morbidly/severely obese (BMI > 40 or > 35 with obesity - related health condition) with health conditions that include hypertension, diabetes mellitus, and dyslipidemias . Weight is improving with lifestyle modifications. BMI  is above average; BMI management plan is completed. We discussed portion control and increasing exercise.

## 2024-01-09 NOTE — TELEPHONE ENCOUNTER
----- Message from Joseph F Seipel, MD sent at 6/10/2020  4:58 PM EDT -----  Sleep study completed order auto CPAP minimum 6 maximum 16  
Pended order for supplies  
09-Jan-2024 14:26:17

## 2024-01-26 LAB
ALBUMIN SERPL-MCNC: 4.8 G/DL (ref 4.1–5.1)
ALBUMIN/GLOB SERPL: 1.7 {RATIO} (ref 1.2–2.2)
ALP SERPL-CCNC: 81 IU/L (ref 44–121)
ALT SERPL-CCNC: 89 IU/L (ref 0–44)
AST SERPL-CCNC: 48 IU/L (ref 0–40)
BILIRUB SERPL-MCNC: 0.6 MG/DL (ref 0–1.2)
BUN SERPL-MCNC: 18 MG/DL (ref 6–20)
BUN/CREAT SERPL: 15 (ref 9–20)
CALCIUM SERPL-MCNC: 10 MG/DL (ref 8.7–10.2)
CHLORIDE SERPL-SCNC: 98 MMOL/L (ref 96–106)
CHOLEST SERPL-MCNC: 202 MG/DL (ref 100–199)
CHOLEST/HDLC SERPL: 6.3 RATIO (ref 0–5)
CO2 SERPL-SCNC: 23 MMOL/L (ref 20–29)
CREAT SERPL-MCNC: 1.17 MG/DL (ref 0.76–1.27)
EGFRCR SERPLBLD CKD-EPI 2021: 85 ML/MIN/1.73
GLOBULIN SER CALC-MCNC: 2.8 G/DL (ref 1.5–4.5)
GLUCOSE SERPL-MCNC: 161 MG/DL (ref 70–99)
HBA1C MFR BLD: 8.4 % (ref 4.8–5.6)
HDLC SERPL-MCNC: 32 MG/DL
LDLC SERPL CALC-MCNC: 104 MG/DL (ref 0–99)
POTASSIUM SERPL-SCNC: 5.4 MMOL/L (ref 3.5–5.2)
PROT SERPL-MCNC: 7.6 G/DL (ref 6–8.5)
SODIUM SERPL-SCNC: 137 MMOL/L (ref 134–144)
TRIGL SERPL-MCNC: 386 MG/DL (ref 0–149)
VLDLC SERPL CALC-MCNC: 66 MG/DL (ref 5–40)

## 2024-01-29 ENCOUNTER — TELEPHONE (OUTPATIENT)
Dept: FAMILY MEDICINE CLINIC | Facility: CLINIC | Age: 34
End: 2024-01-29

## 2024-01-29 NOTE — TELEPHONE ENCOUNTER
Patient called back to reschedule the in office procedure he missed today. There wasn't any open appointments soon. Please advise.

## 2024-02-01 ENCOUNTER — PROCEDURE VISIT (OUTPATIENT)
Dept: FAMILY MEDICINE CLINIC | Facility: CLINIC | Age: 34
End: 2024-02-01
Payer: COMMERCIAL

## 2024-02-01 VITALS
OXYGEN SATURATION: 95 % | RESPIRATION RATE: 18 BRPM | TEMPERATURE: 96.9 F | HEIGHT: 75 IN | HEART RATE: 102 BPM | SYSTOLIC BLOOD PRESSURE: 130 MMHG | DIASTOLIC BLOOD PRESSURE: 84 MMHG | WEIGHT: 315 LBS | BODY MASS INDEX: 39.17 KG/M2

## 2024-02-01 DIAGNOSIS — E78.2 MIXED HYPERLIPIDEMIA: ICD-10-CM

## 2024-02-01 DIAGNOSIS — L91.8 SKIN TAGS, MULTIPLE ACQUIRED: Primary | ICD-10-CM

## 2024-02-01 DIAGNOSIS — I10 PRIMARY HYPERTENSION: ICD-10-CM

## 2024-02-01 DIAGNOSIS — E11.65 UNCONTROLLED TYPE 2 DIABETES MELLITUS WITH HYPERGLYCEMIA: ICD-10-CM

## 2024-02-01 NOTE — ASSESSMENT & PLAN NOTE
Improving. A1C down to 8.4 from 8.7. Tolerating Metformin and Farxiga well.Farxiga has been causing increase in urination. Starting Ozempic 0.25mg weekly through [facility]. Recheck lab work in 2 months.

## 2024-02-01 NOTE — ASSESSMENT & PLAN NOTE
Hypertension is  doing well . Continue current treatment regimen. Blood pressure will be reassessed at the next regular appointment. Today, in office blood pressure was 130/84. Tolerating RX of Lisinopril well.

## 2024-02-01 NOTE — PROGRESS NOTES
Subjective   Bobby Rodriguez is a 33 y.o. male.   Chief Complaint   Patient presents with    Skin Tag Removal    Diabetes    Hyperlipidemia     History of Present Illness  Bobby is here for skin tag removal, and to follow up on blood work.     Skin Lesion: Patient complains of a skin lesion of the under arm (armpit) and neck. The lesion has been present for several months. Lesion has changed in several weeks. Symptoms associated with the lesion are: increasing diameter, increasing number of lesions. Patient denies itching, bleeding, pain.      Diabetes  He presents for his follow-up diabetic visit. He has type 2 diabetes mellitus. Pertinent negatives for hypoglycemia include no dizziness, headaches or sweats. Pertinent negatives for diabetes include no blurred vision, no chest pain and no fatigue. Risk factors for coronary artery disease include obesity, male sex, dyslipidemia, diabetes mellitus and hypertension. Current diabetic treatment includes oral agent (triple therapy). He is following a generally healthy diet. He rarely participates in exercise. His breakfast blood glucose range is generally 130-140 mg/dl. His dinner blood glucose is taken between 4-5 pm. His dinner blood glucose range is generally  mg/dl. He does not see a podiatrist. Eye exam is current.   Hyperlipidemia  This is a chronic problem. The current episode started more than 1 year ago. Recent lipid tests were reviewed and are high. Exacerbating diseases include obesity. Pertinent negatives include no chest pain, leg pain, myalgias or shortness of breath. Current antihyperlipidemic treatment includes statins. Compliance problems: not taking medication.  Risk factors for coronary artery disease include dyslipidemia, male sex, obesity, diabetes mellitus and hypertension.        The following portions of the patient's history were reviewed and updated as appropriate: allergies, current medications, past family history, past medical  "history, past social history, past surgical history, and problem list.    Past Medical History:   Diagnosis Date    Hyperlipidemia     Hypertension        Past Surgical History:   Procedure Laterality Date    EXCISION MASS LEG  2009    AVM at [facility]     SPINE SURGERY  2013    Lumbar; Dr. Bell    TONSILLECTOMY  07/30/1999    Dr. Hyman        Family History   Problem Relation Age of Onset    Diabetes Father     Hyperlipidemia Father     Hypertension Father     Cancer Maternal Grandmother         Pancreatic at 82    Heart disease Maternal Grandfather     Cancer Paternal Grandfather         Pancreatic at 73        Social History     Socioeconomic History    Marital status: Single   Tobacco Use    Smoking status: Never    Smokeless tobacco: Current     Types: Chew    Tobacco comments:     1 can every 2 weeks   Vaping Use    Vaping Use: Never used   Substance and Sexual Activity    Alcohol use: Yes     Alcohol/week: 23.0 standard drinks of alcohol     Types: 20 Cans of beer, 3 Standard drinks or equivalent per week    Drug use: Never    Sexual activity: Yes     Partners: Female     Birth control/protection: Condom, Pill         Review of Systems   Constitutional:  Negative for fatigue.   Eyes:  Negative for blurred vision.   Respiratory:  Negative for shortness of breath.    Cardiovascular:  Negative for chest pain.   Musculoskeletal:  Negative for myalgias.   Neurological:  Negative for dizziness.       Objective   Visit Vitals  /84 (BP Location: Left arm, Patient Position: Sitting, Cuff Size: Large Adult)   Pulse 102   Temp 96.9 °F (36.1 °C) (Temporal)   Resp 18   Ht 190.5 cm (75\")   Wt (!) 155 kg (341 lb 3.2 oz)   SpO2 95%   BMI 42.65 kg/m²     Physical Exam  Vitals and nursing note reviewed.   Constitutional:       Appearance: He is well-developed. He is morbidly obese.   HENT:      Head: Normocephalic.   Neck:      Thyroid: No thyromegaly.      Vascular: No carotid bruit.      Trachea: Trachea normal. "   Cardiovascular:      Rate and Rhythm: Normal rate and regular rhythm.      Heart sounds: No murmur heard.     No friction rub. No gallop.   Pulmonary:      Effort: Pulmonary effort is normal. No respiratory distress.      Breath sounds: Normal breath sounds. No wheezing.   Chest:      Chest wall: No tenderness.   Musculoskeletal:      Cervical back: Neck supple.   Skin:     General: Skin is dry.      Findings: No rash.      Nails: There is no clubbing.      Comments: Multiple large skin tags present left axilla   Neurological:      Mental Status: He is alert and oriented to person, place, and time.   Psychiatric:         Behavior: Behavior is cooperative.         Assessment & Plan   Problem List Items Addressed This Visit          High    Uncontrolled type 2 diabetes mellitus with hyperglycemia    Overview     12- dx  Can only tolerate 1 Metformin 500 mg daily         Current Assessment & Plan     Improving. A1C down to 8.4 from 8.7. Tolerating Metformin and Farxiga well.Farxiga has been causing increase in urination. Starting Ozempic 0.25mg weekly through [facility]. Recheck lab work in 2 months.          Relevant Orders    Hemoglobin A1c    Comprehensive Metabolic Panel       Medium    Hypertension    Current Assessment & Plan     Hypertension is  doing well . Continue current treatment regimen. Blood pressure will be reassessed at the next regular appointment. Today, in office blood pressure was 130/84. Tolerating RX of Lisinopril well.          Mixed hyperlipidemia    Current Assessment & Plan     Lipid abnormalities are worsening. HDL better. LDL Worse. Ratio Worse. Not taking Atorvastatin. Hold off on restarting Atorvastatin due to recent Holidays and change in other medication including Ozempic. Patient advised to watch his diet, increase activity level, increase fluids. Recheck labs in 2 months.          Relevant Orders    Lipid Panel With / Chol / HDL Ratio       Unprioritized    Skin tags,  multiple acquired - Primary    Current Assessment & Plan     Removed 8 skin tags under left arm pit. Area cleansed with alcohol, anesthestized with 1% epinephrine, excised with a 15 blade. Used electrolase to stop bleeding. No sutures needed. Clean area. Applied monsel paste to each area where the skin tag was removed. Pathology sent.           Relevant Orders    Reference Histopathology

## 2024-02-01 NOTE — ASSESSMENT & PLAN NOTE
Lipid abnormalities are worsening. HDL better. LDL Worse. Ratio Worse. Not taking Atorvastatin. Hold off on restarting Atorvastatin due to recent Holidays and change in other medication including Ozempic. Patient advised to watch his diet, increase activity level, increase fluids. Recheck labs in 2 months.

## 2024-02-01 NOTE — ASSESSMENT & PLAN NOTE
Removed 8 skin tags under left arm pit. Area cleansed with alcohol, anesthestized with 1% epinephrine, excised with a 15 blade. Used electrolase to stop bleeding. No sutures needed. Clean area. Applied monsel paste to each area where the skin tag was removed. Pathology sent.

## 2024-02-05 LAB
DX ICD CODE: NORMAL
DX ICD CODE: NORMAL
PATH REPORT.FINAL DX SPEC: NORMAL
PATH REPORT.GROSS SPEC: NORMAL
PATH REPORT.RELEVANT HX SPEC: NORMAL
PATH REPORT.SITE OF ORIGIN SPEC: NORMAL
PATHOLOGIST NAME: NORMAL
PAYMENT PROCEDURE: NORMAL

## 2024-02-16 ENCOUNTER — CLINICAL SUPPORT (OUTPATIENT)
Dept: FAMILY MEDICINE CLINIC | Facility: CLINIC | Age: 34
End: 2024-02-16

## 2024-02-16 VITALS
HEART RATE: 79 BPM | RESPIRATION RATE: 18 BRPM | SYSTOLIC BLOOD PRESSURE: 128 MMHG | WEIGHT: 315 LBS | HEIGHT: 75 IN | DIASTOLIC BLOOD PRESSURE: 84 MMHG | OXYGEN SATURATION: 98 % | BODY MASS INDEX: 39.17 KG/M2 | TEMPERATURE: 97.7 F

## 2024-02-16 DIAGNOSIS — Z02.4 ENCOUNTER FOR CDL (COMMERCIAL DRIVING LICENSE) EXAM: Primary | ICD-10-CM

## 2024-02-16 DIAGNOSIS — E66.01 CLASS 3 SEVERE OBESITY DUE TO EXCESS CALORIES WITH SERIOUS COMORBIDITY AND BODY MASS INDEX (BMI) OF 40.0 TO 44.9 IN ADULT: ICD-10-CM

## 2024-02-16 LAB
BILIRUB BLD-MCNC: NEGATIVE MG/DL
CLARITY, POC: CLEAR
COLOR UR: YELLOW
GLUCOSE UR STRIP-MCNC: ABNORMAL MG/DL
KETONES UR QL: NEGATIVE
LEUKOCYTE EST, POC: NEGATIVE
NITRITE UR-MCNC: NEGATIVE MG/ML
PH UR: 6 [PH] (ref 5–8)
PROT UR STRIP-MCNC: NEGATIVE MG/DL
RBC # UR STRIP: NEGATIVE /UL
SP GR UR: 1.02 (ref 1–1.03)
UROBILINOGEN UR QL: NORMAL

## 2024-04-04 ENCOUNTER — OFFICE VISIT (OUTPATIENT)
Dept: FAMILY MEDICINE CLINIC | Facility: CLINIC | Age: 34
End: 2024-04-04
Payer: COMMERCIAL

## 2024-04-04 VITALS
WEIGHT: 315 LBS | HEIGHT: 75 IN | TEMPERATURE: 97.3 F | RESPIRATION RATE: 15 BRPM | DIASTOLIC BLOOD PRESSURE: 81 MMHG | OXYGEN SATURATION: 99 % | SYSTOLIC BLOOD PRESSURE: 135 MMHG | BODY MASS INDEX: 39.17 KG/M2 | HEART RATE: 84 BPM

## 2024-04-04 DIAGNOSIS — E11.65 UNCONTROLLED TYPE 2 DIABETES MELLITUS WITH HYPERGLYCEMIA: ICD-10-CM

## 2024-04-04 DIAGNOSIS — R74.8 ABNORMAL LIVER ENZYMES: ICD-10-CM

## 2024-04-04 DIAGNOSIS — I10 PRIMARY HYPERTENSION: Primary | ICD-10-CM

## 2024-04-04 DIAGNOSIS — E66.01 CLASS 3 SEVERE OBESITY DUE TO EXCESS CALORIES WITH SERIOUS COMORBIDITY AND BODY MASS INDEX (BMI) OF 40.0 TO 44.9 IN ADULT: ICD-10-CM

## 2024-04-04 DIAGNOSIS — E78.2 MIXED HYPERLIPIDEMIA: ICD-10-CM

## 2024-04-04 LAB
ALBUMIN SERPL-MCNC: 4.6 G/DL (ref 4.1–5.1)
ALBUMIN/GLOB SERPL: 1.6 {RATIO} (ref 1.2–2.2)
ALP SERPL-CCNC: 75 IU/L (ref 44–121)
ALT SERPL-CCNC: 80 IU/L (ref 0–44)
AST SERPL-CCNC: 33 IU/L (ref 0–40)
BILIRUB SERPL-MCNC: 0.4 MG/DL (ref 0–1.2)
BUN SERPL-MCNC: 17 MG/DL (ref 6–20)
BUN/CREAT SERPL: 14 (ref 9–20)
CALCIUM SERPL-MCNC: 9.5 MG/DL (ref 8.7–10.2)
CHLORIDE SERPL-SCNC: 102 MMOL/L (ref 96–106)
CHOLEST SERPL-MCNC: 195 MG/DL (ref 100–199)
CHOLEST/HDLC SERPL: 7 RATIO (ref 0–5)
CO2 SERPL-SCNC: 23 MMOL/L (ref 20–29)
CREAT SERPL-MCNC: 1.23 MG/DL (ref 0.76–1.27)
EGFRCR SERPLBLD CKD-EPI 2021: 80 ML/MIN/1.73
GLOBULIN SER CALC-MCNC: 2.9 G/DL (ref 1.5–4.5)
GLUCOSE SERPL-MCNC: 133 MG/DL (ref 70–99)
HBA1C MFR BLD: 7.1 % (ref 4.8–5.6)
HDLC SERPL-MCNC: 28 MG/DL
LDLC SERPL CALC-MCNC: 85 MG/DL (ref 0–99)
POTASSIUM SERPL-SCNC: 5.3 MMOL/L (ref 3.5–5.2)
PROT SERPL-MCNC: 7.5 G/DL (ref 6–8.5)
SODIUM SERPL-SCNC: 140 MMOL/L (ref 134–144)
TRIGL SERPL-MCNC: 501 MG/DL (ref 0–149)
VLDLC SERPL CALC-MCNC: 82 MG/DL (ref 5–40)

## 2024-04-04 PROCEDURE — 99214 OFFICE O/P EST MOD 30 MIN: CPT | Performed by: FAMILY MEDICINE

## 2024-04-04 RX ORDER — SEMAGLUTIDE 0.25 MG/.5ML
0.5 INJECTION, SOLUTION SUBCUTANEOUS WEEKLY
Qty: 2 ML | Refills: 13 | Status: SHIPPED | OUTPATIENT
Start: 2024-04-04

## 2024-04-04 NOTE — PROGRESS NOTES
Subjective   Bobby Rodriguez is a 33 y.o. male.     Diabetes  He presents for his follow-up diabetic visit. He has type 2 diabetes mellitus. His disease course has been improving. Pertinent negatives for diabetes include no chest pain, no fatigue, no polyphagia, no polyuria and no weight loss.   Hypertension  This is a chronic problem. The current episode started more than 1 year ago. The problem has been improved since onset. The problem is controlled. Pertinent negatives include no chest pain, palpitations or shortness of breath.   Hyperlipidemia  This is a chronic problem. The current episode started more than 1 year ago. The problem is controlled. Pertinent negatives include no chest pain, myalgias or shortness of breath. He is currently on no antihyperlipidemic treatment.        The following portions of the patient's history were reviewed and updated as appropriate: allergies, current medications, past family history, past medical history, past social history, past surgical history, and problem list.    Family History   Problem Relation Age of Onset    Diabetes Father     Hyperlipidemia Father     Hypertension Father     Cancer Maternal Grandmother         Pancreatic at 82    Heart disease Maternal Grandfather     Cancer Paternal Grandfather         Pancreatic at 73       Social History     Tobacco Use    Smoking status: Never    Smokeless tobacco: Current     Types: Chew    Tobacco comments:     1 can every 2 weeks   Vaping Use    Vaping status: Never Used   Substance Use Topics    Alcohol use: Yes     Alcohol/week: 23.0 standard drinks of alcohol     Types: 20 Cans of beer, 3 Standard drinks or equivalent per week    Drug use: Never       Past Surgical History:   Procedure Laterality Date    EXCISION MASS LEG  2009    [facility] at [facility]     SPINE SURGERY  2013    Lumbar; Dr. Bell    TONSILLECTOMY  07/30/1999    Dr. Hyman       Patient Active Problem List   Diagnosis    Situational stress    Mixed hyperlipidemia     Lethargy    Abnormal liver enzymes    Anxiety    Displacement of lumbar intervertebral disc without myelopathy    Class 3 severe obesity due to excess calories with serious comorbidity and body mass index (BMI) of 40.0 to 44.9 in adult    Schmorl's nodes-lumbar region    Sleep walking    Hyperkalemia    Hyperglycemia    Hypertension    Encounter for general adult medical examination with abnormal findings    Snoring    Uncontrolled type 2 diabetes mellitus with hyperglycemia    Nocturia    Immunization counseling    Paresthesia    Skin tags, multiple acquired    Hypoglycemia       Current Outpatient Medications on File Prior to Visit   Medication Sig Dispense Refill    citalopram (CeleXA) 10 MG tablet Take 1 tablet by mouth Daily. 90 tablet 4    dapagliflozin (Farxiga) 5 MG tablet tablet Take 1 tablet by mouth Daily.      lisinopril (PRINIVIL,ZESTRIL) 10 MG tablet Take 1 tablet by mouth Daily.      metFORMIN (GLUCOPHAGE) 500 MG tablet Take 1 tablet by mouth 2 (Two) Times a Day With Meals.      [DISCONTINUED] Semaglutide-Weight Management (Wegovy) 0.25 MG/0.5ML solution auto-injector Inject 0.25 mg under the skin into the appropriate area as directed 1 (One) Time Per Week. 2 mL 13    [DISCONTINUED] atorvastatin (LIPITOR) 10 MG tablet TAKE 1 TABLET BY MOUTH DAILY. (Patient not taking: Reported on 1/3/2024) 90 tablet 0    [DISCONTINUED] Continuous Blood Gluc  (Dexcom G6 ) device Use 1 Device Daily. One  device to continuously monitor blood sugar for insulin dependent diabetic patient (Patient not taking: Reported on 2/1/2024) 1 each 0    [DISCONTINUED] Continuous Blood Gluc Sensor (Dexcom G6 Sensor) Use Every 10 (Ten) Days. Dispense 3 pack of sensors every 30 days to be used with dexcom  to continuously monitor blood sugar for insulin dependent diabetic patient (Patient not taking: Reported on 2/1/2024) 3 each 11    [DISCONTINUED] Continuous Blood Gluc Transmit (Dexcom G6 Transmitter)  "misc Use 1 package Every 3 (Three) Months. Use with dexcom  to continuously monitor blood sugar for insulin dependent diabetic patient (Patient not taking: Reported on 2/1/2024) 1 each 2     No current facility-administered medications on file prior to visit.       No Known Allergies    Review of Systems   Constitutional:  Negative for fatigue, unexpected weight gain and unexpected weight loss.   Eyes:  Negative for visual disturbance.   Respiratory:  Negative for shortness of breath.    Cardiovascular:  Negative for chest pain, palpitations and leg swelling.   Gastrointestinal:  Negative for nausea.   Endocrine: Negative for polyphagia and polyuria.   Genitourinary:  Negative for frequency.   Musculoskeletal:  Negative for myalgias.   Skin:  Negative for dry skin and skin lesions.   Neurological:  Negative for syncope, numbness and headache.       Objective   Visit Vitals  /81 (BP Location: Left arm, Patient Position: Sitting, Cuff Size: Large Adult)   Pulse 84   Temp 97.3 °F (36.3 °C)   Resp 15   Ht 190.5 cm (75\")   Wt (!) 151 kg (333 lb 9.6 oz)   SpO2 99%   BMI 41.70 kg/m²     Physical Exam  Vitals and nursing note reviewed.   Constitutional:       Appearance: He is well-developed.   HENT:      Head: Normocephalic.   Neck:      Thyroid: No thyromegaly.      Vascular: No carotid bruit.      Trachea: Trachea normal.   Cardiovascular:      Rate and Rhythm: Normal rate and regular rhythm.      Heart sounds: No murmur heard.     No friction rub. No gallop.   Pulmonary:      Effort: Pulmonary effort is normal. No respiratory distress.      Breath sounds: Normal breath sounds. No wheezing.   Chest:      Chest wall: No tenderness.   Musculoskeletal:      Cervical back: Neck supple.   Skin:     General: Skin is dry.      Findings: No rash.      Nails: There is no clubbing.   Neurological:      Mental Status: He is alert and oriented to person, place, and time.   Psychiatric:         Behavior: Behavior is " cooperative.           Assessment & Plan .  Problem List Items Addressed This Visit          High    Uncontrolled type 2 diabetes mellitus with hyperglycemia    Overview     12- dx  Can only tolerate 1 Metformin 500 mg daily         Current Assessment & Plan     Improved, Hgb A1c-7.1 down from 8.4   Will increase wegovy to .5 weekly.  Will stop Farxiga due to cost.  $600 per month  Encouraged to watch sugar intake, exercise more and lose weight.   compliant with medication. Patient tolerated farxiga, wegovy, metformin well without side effects. I feel the benefits of the medication outweigh the risks.  But will stop Farxiga due to cost.  monitoring sugar at home intermittently.   Follow up in 3 months  Care management needs are self-addressed. Self-management abilities addressed and patient is capable of managing his own disease.           Relevant Medications    Semaglutide-Weight Management (Wegovy) 0.25 MG/0.5ML solution auto-injector    Other Relevant Orders    Hemoglobin A1c       Medium    Abnormal liver enzymes    Current Assessment & Plan     Improving; Discussed ultrasound, seeing GI- patient declines at this time,          Hypertension - Primary    Current Assessment & Plan     Doing well; Encouraged to watch salt, exercise more and lose weight.  Patient tolerated lisonopril well without side effects. I feel the benefits of the medication outweigh the risks.           Mixed hyperlipidemia    Current Assessment & Plan     Worsening   Encouraged to watch fatty intake, exercise more, and lose weight.   No medication   Is getting adequate diet and exercise  Goals developed at last visit were not met   Follow up in  3 months  Care management needs are self-addressed.  Self-management abilities addressed and patient is capable of managing his own disease.           Relevant Orders    Comprehensive Metabolic Panel    Lipid Panel With / Chol / HDL Ratio       Low    Class 3 severe obesity due to excess  calories with serious comorbidity and body mass index (BMI) of 40.0 to 44.9 in adult    Overview     Diet exercise and wt loss encouraged consequences of obesity discussed          Current Assessment & Plan     Patient's (Body mass index is 41.7 kg/m².) indicates that they are morbidly/severely obese (BMI > 40 or > 35 with obesity - related health condition) with health conditions that include hypertension, diabetes mellitus, and dyslipidemias . Weight is improving with lifestyle modifications. BMI  is above average; BMI management plan is completed. We discussed low calorie, low carb based diet program, portion control, and increasing exercise.

## 2024-04-04 NOTE — ASSESSMENT & PLAN NOTE
Improved, Hgb A1c-7.1 down from 8.4   Will increase wegovy to .5 weekly.  Will stop Farxiga due to cost.  $600 per month  Encouraged to watch sugar intake, exercise more and lose weight.   compliant with medication. Patient tolerated farxiga, wegovy, metformin well without side effects. I feel the benefits of the medication outweigh the risks.  But will stop Farxiga due to cost.  monitoring sugar at home intermittently.   Follow up in 3 months  Care management needs are self-addressed. Self-management abilities addressed and patient is capable of managing his own disease.

## 2024-04-04 NOTE — ASSESSMENT & PLAN NOTE
Worsening   Encouraged to watch fatty intake, exercise more, and lose weight.   No medication   Is getting adequate diet and exercise  Goals developed at last visit were not met   Follow up in  3 months  Care management needs are self-addressed.  Self-management abilities addressed and patient is capable of managing his own disease.

## 2024-04-04 NOTE — ASSESSMENT & PLAN NOTE
Doing well; Encouraged to watch salt, exercise more and lose weight.  Patient tolerated lisonopril well without side effects. I feel the benefits of the medication outweigh the risks.

## 2024-04-04 NOTE — ASSESSMENT & PLAN NOTE
Patient's (Body mass index is 41.7 kg/m².) indicates that they are morbidly/severely obese (BMI > 40 or > 35 with obesity - related health condition) with health conditions that include hypertension, diabetes mellitus, and dyslipidemias . Weight is improving with lifestyle modifications. BMI  is above average; BMI management plan is completed. We discussed low calorie, low carb based diet program, portion control, and increasing exercise.

## 2024-06-06 ENCOUNTER — TELEPHONE (OUTPATIENT)
Dept: FAMILY MEDICINE CLINIC | Facility: CLINIC | Age: 34
End: 2024-06-06
Payer: COMMERCIAL

## 2024-06-06 NOTE — TELEPHONE ENCOUNTER
"[facility] /     NEXT DOSE DUE THIS WEEKEND     Caller: Angelic Rodriguez    Relationship: Emergency Contact    Best call back number:     [phone] (Home)       What medication are you requesting: INCREASE IN DOSE OF WEGOVY COMPOUND         Have you had these symptoms before:    [] Yes  [] No    Have you been treated for these symptoms before:   [x] Yes  [] No    If a prescription is needed, what is your preferred pharmacy and phone number: [facility]OUN[facility]\" - [address] - [phone] [facility] [phone]      Additional notes:        "

## 2024-07-02 ENCOUNTER — OFFICE VISIT (OUTPATIENT)
Dept: FAMILY MEDICINE CLINIC | Facility: CLINIC | Age: 34
End: 2024-07-02
Payer: COMMERCIAL

## 2024-07-02 VITALS
DIASTOLIC BLOOD PRESSURE: 84 MMHG | TEMPERATURE: 98.6 F | OXYGEN SATURATION: 98 % | RESPIRATION RATE: 18 BRPM | HEIGHT: 74 IN | WEIGHT: 315 LBS | BODY MASS INDEX: 40.43 KG/M2 | HEART RATE: 109 BPM | SYSTOLIC BLOOD PRESSURE: 124 MMHG

## 2024-07-02 DIAGNOSIS — R74.8 ABNORMAL LIVER ENZYMES: ICD-10-CM

## 2024-07-02 DIAGNOSIS — E87.5 HYPERKALEMIA: ICD-10-CM

## 2024-07-02 DIAGNOSIS — I10 PRIMARY HYPERTENSION: ICD-10-CM

## 2024-07-02 DIAGNOSIS — E11.65 UNCONTROLLED TYPE 2 DIABETES MELLITUS WITH HYPERGLYCEMIA: Primary | ICD-10-CM

## 2024-07-02 DIAGNOSIS — E66.01 CLASS 3 SEVERE OBESITY DUE TO EXCESS CALORIES WITH SERIOUS COMORBIDITY AND BODY MASS INDEX (BMI) OF 40.0 TO 44.9 IN ADULT: ICD-10-CM

## 2024-07-02 DIAGNOSIS — E78.2 MIXED HYPERLIPIDEMIA: ICD-10-CM

## 2024-07-02 LAB
ALBUMIN SERPL-MCNC: 4.5 G/DL (ref 4.1–5.1)
ALP SERPL-CCNC: 73 IU/L (ref 44–121)
ALT SERPL-CCNC: 71 IU/L (ref 0–44)
AST SERPL-CCNC: 34 IU/L (ref 0–40)
BILIRUB SERPL-MCNC: 0.3 MG/DL (ref 0–1.2)
BUN SERPL-MCNC: 15 MG/DL (ref 6–20)
BUN/CREAT SERPL: 14 (ref 9–20)
CALCIUM SERPL-MCNC: 9.5 MG/DL (ref 8.7–10.2)
CHLORIDE SERPL-SCNC: 98 MMOL/L (ref 96–106)
CHOLEST SERPL-MCNC: 225 MG/DL (ref 100–199)
CHOLEST/HDLC SERPL: 7.8 RATIO (ref 0–5)
CO2 SERPL-SCNC: 25 MMOL/L (ref 20–29)
CREAT SERPL-MCNC: 1.06 MG/DL (ref 0.76–1.27)
EGFRCR SERPLBLD CKD-EPI 2021: 96 ML/MIN/1.73
GLOBULIN SER CALC-MCNC: 2.9 G/DL (ref 1.5–4.5)
GLUCOSE SERPL-MCNC: 162 MG/DL (ref 70–99)
HBA1C MFR BLD: 7.4 % (ref 4.8–5.6)
HDLC SERPL-MCNC: 29 MG/DL
LDLC SERPL CALC-MCNC: 101 MG/DL (ref 0–99)
POTASSIUM SERPL-SCNC: 5 MMOL/L (ref 3.5–5.2)
PROT SERPL-MCNC: 7.4 G/DL (ref 6–8.5)
SODIUM SERPL-SCNC: 137 MMOL/L (ref 134–144)
TRIGL SERPL-MCNC: 557 MG/DL (ref 0–149)
VLDLC SERPL CALC-MCNC: 95 MG/DL (ref 5–40)

## 2024-07-02 PROCEDURE — 99214 OFFICE O/P EST MOD 30 MIN: CPT | Performed by: FAMILY MEDICINE

## 2024-07-02 RX ORDER — SEMAGLUTIDE 0.5 MG/.5ML
0.5 INJECTION, SOLUTION SUBCUTANEOUS WEEKLY
Qty: 6 ML | Refills: 3 | Status: SHIPPED | OUTPATIENT
Start: 2024-07-02

## 2024-07-02 RX ORDER — DAPAGLIFLOZIN 10 MG/1
10 TABLET, FILM COATED ORAL DAILY
Qty: 30 TABLET | Refills: 12 | Status: SHIPPED | OUTPATIENT
Start: 2024-07-02

## 2024-07-02 RX ORDER — LOVASTATIN 10 MG/1
10 TABLET ORAL NIGHTLY
Qty: 30 TABLET | Refills: 12 | Status: SHIPPED | OUTPATIENT
Start: 2024-07-02

## 2024-07-02 NOTE — ASSESSMENT & PLAN NOTE
A1c done 7-1-2024, read by me, reviewed with pt.  A1c was 7.4 up from 7.1  Worsening. Increase Farxiga to 10 mg daily up from 5 mg daily and increase Wegovy to 0.5 mg weekly up from 0.25 weekly.  Encouraged to watch sugar intake, exercise more and lose weight.   Compliant with medication.   Not monitoring sugar at home.   Follow up in 2 months  Care management needs are self-addressed. Self-management abilities addressed and patient is capable of managing his own disease.

## 2024-07-02 NOTE — PROGRESS NOTES
Subjective   Bobby Rodriguez is a 33 y.o. male.     Hypertension  This is a chronic problem. The current episode started more than 1 year ago. The problem is unchanged. The problem is controlled. Pertinent negatives include no chest pain, palpitations or shortness of breath.   Hyperlipidemia  This is a chronic problem. The current episode started more than 1 year ago. The problem is uncontrolled. Recent lipid tests were reviewed and are high. Exacerbating diseases include diabetes and liver disease. Factors aggravating his hyperlipidemia include fatty foods. Pertinent negatives include no chest pain, myalgias or shortness of breath. He is currently on no antihyperlipidemic treatment. The current treatment provides no improvement of lipids.   Diabetes  He presents for his follow-up diabetic visit. He has type 2 diabetes mellitus. His disease course has been worsening. Pertinent negatives for diabetes include no chest pain, no fatigue, no polyphagia, no polyuria and no weight loss.        The following portions of the patient's history were reviewed and updated as appropriate: allergies, current medications, past family history, past medical history, past social history, past surgical history, and problem list.    Family History   Problem Relation Age of Onset    Diabetes Father     Hyperlipidemia Father     Hypertension Father     Cancer Maternal Grandmother         Pancreatic at 82    Heart disease Maternal Grandfather     Cancer Paternal Grandfather         Pancreatic at 73       Social History     Tobacco Use    Smoking status: Never    Smokeless tobacco: Current     Types: Chew    Tobacco comments:     1 can every 2 weeks   Vaping Use    Vaping status: Never Used   Substance Use Topics    Alcohol use: Yes     Alcohol/week: 23.0 standard drinks of alcohol     Types: 20 Cans of beer, 3 Standard drinks or equivalent per week    Drug use: Never       Past Surgical History:   Procedure Laterality Date    EXCISION  MASS LEG  2009    AVM at [facility]     SPINE SURGERY  2013    Lumbar; Dr. Bell    TONSILLECTOMY  07/30/1999    Dr. Hyman       Patient Active Problem List   Diagnosis    Situational stress    Mixed hyperlipidemia    Lethargy    Abnormal liver enzymes    Anxiety    Displacement of lumbar intervertebral disc without myelopathy    Class 3 severe obesity due to excess calories with serious comorbidity and body mass index (BMI) of 40.0 to 44.9 in adult    Schmorl's nodes-lumbar region    Sleep walking    Hyperkalemia    Hyperglycemia    Hypertension    Encounter for general adult medical examination with abnormal findings    Snoring    Uncontrolled type 2 diabetes mellitus with hyperglycemia    Nocturia    Immunization counseling    Paresthesia    Skin tags, multiple acquired    Hypoglycemia       Current Outpatient Medications on File Prior to Visit   Medication Sig Dispense Refill    citalopram (CeleXA) 10 MG tablet Take 1 tablet by mouth Daily. 90 tablet 4    lisinopril (PRINIVIL,ZESTRIL) 10 MG tablet Take 1 tablet by mouth Daily.      metFORMIN (GLUCOPHAGE) 500 MG tablet Take 1 tablet by mouth 2 (Two) Times a Day With Meals. (Patient taking differently: Take 1 tablet by mouth Daily With Breakfast.)       No current facility-administered medications on file prior to visit.       No Known Allergies    Review of Systems   Constitutional:  Negative for fatigue, unexpected weight gain and unexpected weight loss.   Eyes:  Negative for visual disturbance.   Respiratory:  Negative for shortness of breath.    Cardiovascular:  Negative for chest pain, palpitations and leg swelling.   Gastrointestinal:  Negative for abdominal pain, constipation, diarrhea, nausea, vomiting and indigestion.   Endocrine: Negative for polyphagia and polyuria.   Genitourinary:  Negative for frequency.   Musculoskeletal:  Negative for myalgias.   Skin:  Negative for dry skin and skin lesions.   Neurological:  Negative for syncope, numbness and headache.  "      Objective   Visit Vitals  /84 (BP Location: Left arm, Patient Position: Sitting, Cuff Size: Large Adult)   Pulse 109   Temp 98.6 °F (37 °C) (Temporal)   Resp 18   Ht 188 cm (74\")   Wt (!) 153 kg (338 lb 3.2 oz)   SpO2 98%   BMI 43.42 kg/m²     Physical Exam  Vitals and nursing note reviewed.   Constitutional:       Appearance: He is well-developed.   HENT:      Head: Normocephalic.   Neck:      Thyroid: No thyromegaly.      Vascular: No carotid bruit.      Trachea: Trachea normal.   Cardiovascular:      Rate and Rhythm: Normal rate and regular rhythm.      Heart sounds: No murmur heard.     No friction rub. No gallop.   Pulmonary:      Effort: Pulmonary effort is normal. No respiratory distress.      Breath sounds: Normal breath sounds. No wheezing.   Chest:      Chest wall: No tenderness.   Musculoskeletal:      Cervical back: Neck supple.   Skin:     General: Skin is dry.      Findings: No rash.      Nails: There is no clubbing.   Neurological:      Mental Status: He is alert and oriented to person, place, and time.   Psychiatric:         Behavior: Behavior is cooperative.           Assessment & Plan .  Problem List Items Addressed This Visit          High    Uncontrolled type 2 diabetes mellitus with hyperglycemia - Primary    Overview     12- dx  Farxiga is not covered by insurance and cost pt. $800 per month which she has been paying cash for, thus pt. Given 9 weeks of samples of Farxiga.  Pt. Tolerating Wegovy well with no side effects.           Current Assessment & Plan     A1c done 7-1-2024, read by me, reviewed with pt.  A1c was 7.4 up from 7.1  Worsening. Increase Farxiga to 10 mg daily up from 5 mg daily and increase Wegovy to 0.5 mg weekly up from 0.25 weekly.  Encouraged to watch sugar intake, exercise more and lose weight.   Compliant with medication.   Not monitoring sugar at home.   Follow up in 2 months  Care management needs are self-addressed. Self-management abilities " addressed and patient is capable of managing his own disease.           Relevant Medications    dapagliflozin Propanediol (Farxiga) 10 MG tablet    Semaglutide-Weight Management (Wegovy) 0.5 MG/0.5ML solution auto-injector    Other Relevant Orders    Hemoglobin A1c       Medium    Abnormal liver enzymes    Overview     Patient has decreased alcohol intake.         Current Assessment & Plan     Improved.  CMP done 7-1-2024, read by me, reviewed with pt.  AST was 34 up from 33, ALT was 71 down from 80         Hypertension    Current Assessment & Plan     Doing well. Patient tolerated Lisinopril well without side effects. I feel the benefits of the medication outweigh the risks.   Encouraged to watch salt, exercise more and lose weight.           Mixed hyperlipidemia    Current Assessment & Plan     Lipid and CMP done 7-1-2024, read by me, reviewed with pt.  Trig. 557 up from 501, Tot. Chol. 225 up from 195, HDL 29 up from 28,  up from 85  Worsening.  Start Mevacor 10 mg daily, benefits and risks of Mevacor discussed including elevating liver functions with patient.  Will watch liver functions closely.  Encouraged to watch fatty intake, exercise more, and lose weight.   No medication   Is not getting adequate diet and exercise  Goals developed at last visit were not met   Follow up in  2 months  Care management needs are self-addressed.  Self-management abilities addressed and patient is capable of managing his own disease.           Relevant Medications    lovastatin (MEVACOR) 10 MG tablet    Other Relevant Orders    Lipid Panel With / Chol / HDL Ratio    Comprehensive Metabolic Panel       Low    Class 3 severe obesity due to excess calories with serious comorbidity and body mass index (BMI) of 40.0 to 44.9 in adult    Overview     Diet exercise and wt loss encouraged consequences of obesity discussed          Current Assessment & Plan     Patient's (Body mass index is 43.42 kg/m².) indicates that they are  morbidly/severely obese (BMI > 40 or > 35 with obesity - related health condition) with health conditions that include hypertension, diabetes mellitus, and dyslipidemias . Weight is worsening. BMI  is above average; BMI management plan is completed. We discussed portion control and increasing exercise.          Hyperkalemia    Current Assessment & Plan     Improved. CMP done 7-1-2024, read by me, reviewed with pt.  Potassium was 5.0 down from 5.3

## 2024-07-02 NOTE — ASSESSMENT & PLAN NOTE
Lipid and CMP done 7-1-2024, read by me, reviewed with pt.  Trig. 557 up from 501, Tot. Chol. 225 up from 195, HDL 29 up from 28,  up from 85  Worsening.  Start Mevacor 10 mg daily, benefits and risks of Mevacor discussed including elevating liver functions with patient.  Will watch liver functions closely.  Encouraged to watch fatty intake, exercise more, and lose weight.   No medication   Is not getting adequate diet and exercise  Goals developed at last visit were not met   Follow up in  2 months  Care management needs are self-addressed.  Self-management abilities addressed and patient is capable of managing his own disease.

## 2024-07-02 NOTE — ASSESSMENT & PLAN NOTE
Patient's (Body mass index is 43.42 kg/m².) indicates that they are morbidly/severely obese (BMI > 40 or > 35 with obesity - related health condition) with health conditions that include hypertension, diabetes mellitus, and dyslipidemias . Weight is worsening. BMI  is above average; BMI management plan is completed. We discussed portion control and increasing exercise.

## 2024-07-02 NOTE — ASSESSMENT & PLAN NOTE
Improved.  CMP done 7-1-2024, read by me, reviewed with pt.  AST was 34 up from 33, ALT was 71 down from 80

## 2024-07-09 ENCOUNTER — OFFICE VISIT (OUTPATIENT)
Dept: FAMILY MEDICINE CLINIC | Facility: CLINIC | Age: 34
End: 2024-07-09
Payer: COMMERCIAL

## 2024-07-09 ENCOUNTER — TELEPHONE (OUTPATIENT)
Dept: FAMILY MEDICINE CLINIC | Facility: CLINIC | Age: 34
End: 2024-07-09

## 2024-07-09 VITALS
OXYGEN SATURATION: 96 % | TEMPERATURE: 97.8 F | DIASTOLIC BLOOD PRESSURE: 82 MMHG | BODY MASS INDEX: 40.43 KG/M2 | RESPIRATION RATE: 14 BRPM | WEIGHT: 315 LBS | SYSTOLIC BLOOD PRESSURE: 138 MMHG | HEIGHT: 74 IN | HEART RATE: 88 BPM

## 2024-07-09 DIAGNOSIS — J10.1 INFLUENZA A: Primary | ICD-10-CM

## 2024-07-09 LAB
EXPIRATION DATE: ABNORMAL
FLUAV AG UPPER RESP QL IA.RAPID: DETECTED
FLUBV AG UPPER RESP QL IA.RAPID: NOT DETECTED
INTERNAL CONTROL: ABNORMAL
Lab: ABNORMAL
SARS-COV-2 AG UPPER RESP QL IA.RAPID: NOT DETECTED

## 2024-07-09 PROCEDURE — 99213 OFFICE O/P EST LOW 20 MIN: CPT | Performed by: FAMILY MEDICINE

## 2024-07-09 PROCEDURE — 87428 SARSCOV & INF VIR A&B AG IA: CPT | Performed by: FAMILY MEDICINE

## 2024-07-09 RX ORDER — OSELTAMIVIR PHOSPHATE 75 MG/1
75 CAPSULE ORAL 2 TIMES DAILY
Qty: 10 CAPSULE | Refills: 0 | Status: SHIPPED | OUTPATIENT
Start: 2024-07-09

## 2024-07-09 NOTE — PROGRESS NOTES
Subjective   Bobby Rodriguez is a 33 y.o. male.     URI   This is a new problem. The current episode started yesterday. The problem has been gradually worsening. There has been no fever. Associated symptoms include congestion, coughing and headaches. Pertinent negatives include no diarrhea, ear pain, nausea, rash, sore throat, vomiting or wheezing.        The following portions of the patient's history were reviewed and updated as appropriate: allergies, current medications, past family history, past medical history, past social history, past surgical history, and problem list.    Family History   Problem Relation Age of Onset    Diabetes Father     Hyperlipidemia Father     Hypertension Father     Cancer Maternal Grandmother         Pancreatic at 82    Heart disease Maternal Grandfather     Cancer Paternal Grandfather         Pancreatic at 73       Social History     Tobacco Use    Smoking status: Never    Smokeless tobacco: Current     Types: Chew    Tobacco comments:     1 can every 2 weeks   Vaping Use    Vaping status: Never Used   Substance Use Topics    Alcohol use: Yes     Alcohol/week: 23.0 standard drinks of alcohol     Types: 20 Cans of beer, 3 Standard drinks or equivalent per week    Drug use: Never       Past Surgical History:   Procedure Laterality Date    EXCISION MASS LEG  2009    AVM at [facility]     SPINE SURGERY  2013    Lumbar; Dr. Bell    TONSILLECTOMY  07/30/1999    Dr. Hyman       Patient Active Problem List   Diagnosis    Situational stress    Mixed hyperlipidemia    Lethargy    Abnormal liver enzymes    Anxiety    Displacement of lumbar intervertebral disc without myelopathy    Class 3 severe obesity due to excess calories with serious comorbidity and body mass index (BMI) of 40.0 to 44.9 in adult    Schmorl's nodes-lumbar region    Sleep walking    Hyperkalemia    Hyperglycemia    Hypertension    Encounter for general adult medical examination with abnormal findings    Snoring     "Uncontrolled type 2 diabetes mellitus with hyperglycemia    Nocturia    Immunization counseling    Paresthesia    Skin tags, multiple acquired    Hypoglycemia    Influenza A       Current Outpatient Medications on File Prior to Visit   Medication Sig Dispense Refill    citalopram (CeleXA) 10 MG tablet Take 1 tablet by mouth Daily. 90 tablet 4    dapagliflozin Propanediol (Farxiga) 10 MG tablet Take 10 mg by mouth Daily. 30 tablet 12    lisinopril (PRINIVIL,ZESTRIL) 10 MG tablet Take 1 tablet by mouth Daily.      lovastatin (MEVACOR) 10 MG tablet Take 1 tablet by mouth Every Night. 30 tablet 12    metFORMIN (GLUCOPHAGE) 500 MG tablet Take 1 tablet by mouth 2 (Two) Times a Day With Meals. (Patient taking differently: Take 1 tablet by mouth Daily With Breakfast.)      Semaglutide-Weight Management (Wegovy) 0.5 MG/0.5ML solution auto-injector Inject 0.5 mL under the skin into the appropriate area as directed 1 (One) Time Per Week. 6 mL 3     No current facility-administered medications on file prior to visit.       No Known Allergies    Review of Systems   Constitutional:  Positive for chills. Negative for fatigue and fever.   HENT:  Positive for congestion. Negative for ear pain, sore throat and voice change.    Respiratory:  Positive for cough. Negative for shortness of breath and wheezing.    Gastrointestinal:  Negative for diarrhea, nausea and vomiting.   Musculoskeletal:  Positive for myalgias.   Skin:  Negative for rash.   Neurological:  Negative for headache.       Objective   Visit Vitals  /82 (BP Location: Left arm, Patient Position: Sitting, Cuff Size: Large Adult)   Pulse 88   Temp 97.8 °F (36.6 °C)   Resp 14   Ht 188 cm (74\")   Wt (!) 150 kg (329 lb 12.8 oz)   SpO2 96%   BMI 42.34 kg/m²     Physical Exam  Vitals and nursing note reviewed.   Constitutional:       Appearance: He is well-developed.   HENT:      Head: Normocephalic.      Right Ear: Tympanic membrane, ear canal and external ear normal. No " middle ear effusion. There is no impacted cerumen.      Left Ear: Tympanic membrane, ear canal and external ear normal.  No middle ear effusion. There is no impacted cerumen.      Nose: No septal deviation, mucosal edema or congestion.      Right Sinus: No maxillary sinus tenderness or frontal sinus tenderness.      Left Sinus: No maxillary sinus tenderness or frontal sinus tenderness.      Mouth/Throat:      Mouth: No oral lesions.      Pharynx: No oropharyngeal exudate.      Tonsils: No tonsillar abscesses.   Neck:      Thyroid: No thyromegaly.      Vascular: No carotid bruit.      Trachea: Trachea normal.   Cardiovascular:      Rate and Rhythm: Normal rate and regular rhythm.      Heart sounds: No murmur heard.     No friction rub. No gallop.   Pulmonary:      Effort: Pulmonary effort is normal. No respiratory distress.      Breath sounds: Normal breath sounds. No wheezing.   Chest:      Chest wall: No tenderness.   Musculoskeletal:      Cervical back: Neck supple.   Skin:     General: Skin is dry.      Findings: No rash.      Nails: There is no clubbing.   Neurological:      Mental Status: He is alert and oriented to person, place, and time.   Psychiatric:         Behavior: Behavior is cooperative.           Assessment & Plan .  Problem List Items Addressed This Visit          Unprioritized    Influenza A - Primary    Current Assessment & Plan     New diagnosis-Advised patient to start Tamiflu. Increase fluids and rest.  Benefits and risk discussed I feel benefits outweigh the risk.  Warned patient that he is contagious and need to be off work for a few weeks.          Relevant Medications    oseltamivir (TAMIFLU) 75 MG capsule    Other Relevant Orders    POCT SARS-CoV-2 Antigen STEFANO + Flu (Completed)

## 2024-07-09 NOTE — ASSESSMENT & PLAN NOTE
New diagnosis-Advised patient to start Tamiflu. Increase fluids and rest.  Benefits and risk discussed I feel benefits outweigh the risk.  Warned patient that he is contagious and need to be off work for a few weeks.

## 2024-07-09 NOTE — TELEPHONE ENCOUNTER
Caller: Angelic Rodriguez    Relationship: Emergency Contact    Best call back number: [phone]     What medication are you requesting:      What are your current symptoms:  BODY ACHES, FEVER, PALE, COUGHING, HEADACHE, HEAD CONGESTION    How long have you been experiencing symptoms: YESTERDAY 7/08/24     Have you had these symptoms before:    [] Yes  [] No    Have you been treated for these symptoms before:   [] Yes  [] No    If a prescription is needed, what is your preferred pharmacy and phone number: [facility] - [facility] [place] - [address] - [phone] [facility] [phone]      Additional notes: PATIENT WIFE ANGELIC CALLED STATED THE PATIENT HAS BODY ACHES, FEVER, PALE, COUGHING, HEADACHE, HEAD CONGESTION.  WANTS TO KNOW IF DR TERRAZAS CAN CALL IN SOMETHING TO THE PHARMACY FOR HIM.  HE WAS LAST SEEN ON 7/02/24.  THE OTHER MEMBERS OF THE HOUSEHOLD HAS BEEN DIAGNOSED WITH THE FLU.

## 2024-08-14 NOTE — ASSESSMENT & PLAN NOTE
ED Attending Physician Aguada of Care Note       Note - any recorded times are estimates and are as accurate as possible    Assumed care of the patient after face to face sign out from Dr. Kessler awaiting MRI    MRI LUMBAR SPINE WO CONTRAST   Final Result      1. Degenerative changes of the lumbar spine as described above, most   pronounced at L3-L4 where there is mild to moderate central spinal canal   stenosis and severe left and moderate right neuroforaminal narrowing.   Findings appear similar to slightly progressed compared to the previous   MRI.      Electronically Signed by: LIAM ROMERO DO    Signed on: 8/14/2024 3:38 AM    Workstation ID: [id number]          Mri without dangerous pathology. Has required multiple doses of parenteral analgesics for control of her back pain. Will hospitalize for pain control. Endorsed to hospitalist by ed resident.      Ángel Taylor MD  08/14/24 1105     Worse.  CMP done 10-, read by me, reviewed with pt.  AST was 71 up from 35, ALT was 107 up from 75.  Discussed GI referral and Hepatitis panel.  Advised trial off Lipitor due to elevated liver functions and will hold on hepatitis panel and GI consultation..   Will repeat CMP in 3 months.

## 2024-11-20 DIAGNOSIS — F41.9 ANXIETY: ICD-10-CM

## 2024-11-21 ENCOUNTER — TELEPHONE (OUTPATIENT)
Dept: FAMILY MEDICINE CLINIC | Facility: CLINIC | Age: 34
End: 2024-11-21
Payer: COMMERCIAL

## 2024-11-21 NOTE — TELEPHONE ENCOUNTER
Called and left a message for Bobby to call office for an appointment. We received a refill request for Celexa

## 2024-11-25 ENCOUNTER — OFFICE VISIT (OUTPATIENT)
Dept: FAMILY MEDICINE CLINIC | Facility: CLINIC | Age: 34
End: 2024-11-25
Payer: COMMERCIAL

## 2024-11-25 VITALS
HEIGHT: 74 IN | WEIGHT: 315 LBS | HEART RATE: 98 BPM | RESPIRATION RATE: 18 BRPM | BODY MASS INDEX: 40.43 KG/M2 | SYSTOLIC BLOOD PRESSURE: 126 MMHG | OXYGEN SATURATION: 97 % | DIASTOLIC BLOOD PRESSURE: 82 MMHG | TEMPERATURE: 96.9 F

## 2024-11-25 DIAGNOSIS — R35.1 NOCTURIA: ICD-10-CM

## 2024-11-25 DIAGNOSIS — M79.10 MYALGIA: ICD-10-CM

## 2024-11-25 DIAGNOSIS — I10 PRIMARY HYPERTENSION: Primary | ICD-10-CM

## 2024-11-25 DIAGNOSIS — E11.65 UNCONTROLLED TYPE 2 DIABETES MELLITUS WITH HYPERGLYCEMIA: ICD-10-CM

## 2024-11-25 DIAGNOSIS — R53.83 LETHARGY: ICD-10-CM

## 2024-11-25 DIAGNOSIS — E78.2 MIXED HYPERLIPIDEMIA: ICD-10-CM

## 2024-11-25 PROCEDURE — 99214 OFFICE O/P EST MOD 30 MIN: CPT | Performed by: FAMILY MEDICINE

## 2024-11-25 RX ORDER — LISINOPRIL 10 MG/1
10 TABLET ORAL DAILY
Qty: 30 TABLET | Refills: 0 | Status: SHIPPED | OUTPATIENT
Start: 2024-11-25

## 2024-11-25 RX ORDER — DAPAGLIFLOZIN 10 MG/1
10 TABLET, FILM COATED ORAL DAILY
Qty: 30 TABLET | Refills: 0 | Status: SHIPPED | OUTPATIENT
Start: 2024-11-25 | End: 2024-11-27 | Stop reason: SDUPTHER

## 2024-11-25 RX ORDER — CITALOPRAM HYDROBROMIDE 10 MG/1
10 TABLET ORAL DAILY
Qty: 90 TABLET | Refills: 0 | Status: SHIPPED | OUTPATIENT
Start: 2024-11-25 | End: 2024-11-25

## 2024-11-25 RX ORDER — CITALOPRAM HYDROBROMIDE 10 MG/1
10 TABLET ORAL DAILY
Qty: 30 TABLET | Refills: 0 | Status: SHIPPED | OUTPATIENT
Start: 2024-11-25

## 2024-11-25 RX ORDER — LOVASTATIN 10 MG/1
10 TABLET ORAL NIGHTLY
Qty: 30 TABLET | Refills: 0 | Status: SHIPPED | OUTPATIENT
Start: 2024-11-25

## 2024-11-25 NOTE — ASSESSMENT & PLAN NOTE
New dx.  But mild.  Will order labs today and patient will return for results and shared decision making.

## 2024-11-25 NOTE — ASSESSMENT & PLAN NOTE
--Unknown control, patient is late for follow up, due to hectic work and home life.  I had a long discussion with patient about making his health a priority and patient has scheduled a Physical exam.  Last blood sugar patient checked was 175.  Advised to check blood sugar daily.  Patient tolerated Farxiga, Metformin and Ozempic well without side effects. I feel the benefits of the medication outweigh the risks.   Encouraged to watch sugar intake, exercise more, lose weight, Will order labs today and patient will return for results and shared decision making.

## 2024-11-25 NOTE — PROGRESS NOTES
Subjective   Bobby Rodriguez is a 33 y.o. male.   Chief Complaint   Patient presents with    Hypertension    Diabetes    myalgia     History of Present Illness  Follow up Myalgias  Hypertension  This is a chronic problem. The current episode started more than 1 year ago. The problem is unchanged. The problem is controlled. Pertinent negatives include no chest pain, palpitations or shortness of breath.   Diabetes  He presents for his follow-up diabetic visit. He has type 2 diabetes mellitus. Associated symptoms include fatigue. Pertinent negatives for diabetes include no chest pain.        The following portions of the patient's history were reviewed and updated as appropriate: allergies, current medications, past family history, past medical history, past social history, past surgical history, and problem list.    Past Medical History:   Diagnosis Date    Hyperlipidemia     Hypertension        Past Surgical History:   Procedure Laterality Date    EXCISION MASS LEG  2009    [facility]M at [facility]     SPINE SURGERY  2013    [facility]; Dr. Bell    TONSILLECTOMY  07/30/1999    Dr. Hyman        Family History   Problem Relation Age of Onset    Diabetes Father     Hyperlipidemia Father     Hypertension Father     Cancer Maternal Grandmother         Pancreatic at 82    Heart disease Maternal Grandfather     Cancer Paternal Grandfather         Pancreatic at 73        Social History     Socioeconomic History    Marital status: Single   Tobacco Use    Smoking status: Never    Smokeless tobacco: Current     Types: Chew    Tobacco comments:     1 can every 2 weeks   Vaping Use    Vaping status: Never Used   Substance and Sexual Activity    Alcohol use: Yes     Alcohol/week: 23.0 standard drinks of alcohol     Types: 20 Cans of beer, 3 Standard drinks or equivalent per week    Drug use: Never    Sexual activity: Yes     Partners: Female     Birth control/protection: Condom, Pill       Outpatient Medications Prior to Visit   Medication Sig  "Dispense Refill    citalopram (CeleXA) 10 MG tablet TAKE 1 TABLET BY MOUTH DAILY. 30 tablet 0    metFORMIN (GLUCOPHAGE) 500 MG tablet Take 1 tablet by mouth 2 (Two) Times a Day With Meals. (Patient taking differently: Take 1 tablet by mouth Daily With Breakfast.)      Semaglutide-Weight Management (Wegovy) 0.5 MG/0.5ML solution auto-injector Inject 0.5 mL under the skin into the appropriate area as directed 1 (One) Time Per Week. 6 mL 3    citalopram (CeleXA) 10 MG tablet Take 1 tablet by mouth Daily. 90 tablet 4    dapagliflozin Propanediol (Farxiga) 10 MG tablet Take 10 mg by mouth Daily. 30 tablet 12    lisinopril (PRINIVIL,ZESTRIL) 10 MG tablet Take 1 tablet by mouth Daily.      lovastatin (MEVACOR) 10 MG tablet Take 1 tablet by mouth Every Night. 30 tablet 12    oseltamivir (TAMIFLU) 75 MG capsule Take 1 capsule by mouth 2 (Two) Times a Day. 10 capsule 0     No facility-administered medications prior to visit.        Review of Systems   Constitutional:  Positive for fatigue.   HENT:  Negative for hearing loss.    Respiratory:  Negative for shortness of breath.    Cardiovascular:  Negative for chest pain and palpitations.   Genitourinary:  Positive for nocturia. Negative for frequency.        Nocturia   Musculoskeletal:  Positive for myalgias. Negative for joint swelling.   Neurological:  Negative for memory problem.       Objective   Visit Vitals  /82 (BP Location: Left arm, Patient Position: Sitting, Cuff Size: Large Adult)   Pulse 98   Temp 96.9 °F (36.1 °C) (Temporal)   Resp 18   Ht 188 cm (74\")   Wt (!) 152 kg (335 lb 6.4 oz)   SpO2 97%   BMI 43.06 kg/m²     Physical Exam  Vitals and nursing note reviewed.   Constitutional:       Appearance: He is well-developed.   HENT:      Head: Normocephalic.   Neck:      Thyroid: No thyromegaly.      Vascular: No carotid bruit.      Trachea: Trachea normal.   Cardiovascular:      Rate and Rhythm: Normal rate and regular rhythm.      Heart sounds: No murmur " heard.     No friction rub. No gallop.   Pulmonary:      Effort: Pulmonary effort is normal. No respiratory distress.      Breath sounds: Normal breath sounds. No wheezing.   Chest:      Chest wall: No tenderness.   Musculoskeletal:      Cervical back: Neck supple.   Skin:     General: Skin is dry.      Findings: No rash.      Nails: There is no clubbing.   Neurological:      Mental Status: He is alert and oriented to person, place, and time.   Psychiatric:         Behavior: Behavior is cooperative.         Assessment & Plan   Problem List Items Addressed This Visit          High    Uncontrolled type 2 diabetes mellitus with hyperglycemia    Overview     12- dx  Farxiga is not covered by insurance and cost pt. $800 per month which she has been paying cash for, thus pt. Given 9 weeks of samples of Farxiga.  Pt. Tolerating Wegovy well with no side effects.           Current Assessment & Plan     --Unknown control, patient is late for follow up, due to hectic work and home life.  I had a long discussion with patient about making his health a priority and patient has scheduled a Physical exam.  Last blood sugar patient checked was 175.  Advised to check blood sugar daily.  Patient tolerated Farxiga, Metformin and Ozempic well without side effects. I feel the benefits of the medication outweigh the risks.   Encouraged to watch sugar intake, exercise more, lose weight, Will order labs today and patient will return for results and shared decision making.             Relevant Medications    dapagliflozin Propanediol (Farxiga) 10 MG tablet    Other Relevant Orders    Microalbumin / Creatinine Urine Ratio - Urine, Clean Catch    Hemoglobin A1c       Medium    Hypertension - Primary    Current Assessment & Plan     Doing well.  Patient tolerated Lisinopril well without side effects. I feel the benefits of the medication outweigh the risks.   Encouraged to watch salt, exercise more and lose weight.           Relevant  Medications    lisinopril (PRINIVIL,ZESTRIL) 10 MG tablet    Mixed hyperlipidemia    Current Assessment & Plan      Will order labs today and patient will return for results and shared decision making.           Relevant Medications    lovastatin (MEVACOR) 10 MG tablet    Other Relevant Orders    Lipid Panel With / Chol / HDL Ratio    Comprehensive Metabolic Panel       Unprioritized    Lethargy    Current Assessment & Plan     Will order labs today and patient will return for results and shared decision making.           Relevant Orders    CBC & Differential    TSH    Myalgia    Current Assessment & Plan     New dx.  But mild.  Will order labs today and patient will return for results and shared decision making.           Relevant Orders    CK    Nocturia    Current Assessment & Plan     Will order labs today and patient will return for results and shared decision making.           Relevant Orders    Urinalysis without microscopic (no culture) - Urine, Clean Catch

## 2024-11-27 ENCOUNTER — OFFICE VISIT (OUTPATIENT)
Dept: FAMILY MEDICINE CLINIC | Facility: CLINIC | Age: 34
End: 2024-11-27
Payer: COMMERCIAL

## 2024-11-27 ENCOUNTER — TELEPHONE (OUTPATIENT)
Dept: FAMILY MEDICINE CLINIC | Facility: CLINIC | Age: 34
End: 2024-11-27
Payer: COMMERCIAL

## 2024-11-27 VITALS
BODY MASS INDEX: 40.43 KG/M2 | HEIGHT: 74 IN | DIASTOLIC BLOOD PRESSURE: 88 MMHG | RESPIRATION RATE: 18 BRPM | TEMPERATURE: 97.1 F | OXYGEN SATURATION: 96 % | SYSTOLIC BLOOD PRESSURE: 119 MMHG | HEART RATE: 101 BPM | WEIGHT: 315 LBS

## 2024-11-27 DIAGNOSIS — R04.0 EPISTAXIS: ICD-10-CM

## 2024-11-27 DIAGNOSIS — J06.9 UPPER RESPIRATORY TRACT INFECTION, UNSPECIFIED TYPE: ICD-10-CM

## 2024-11-27 DIAGNOSIS — E11.65 UNCONTROLLED TYPE 2 DIABETES MELLITUS WITH HYPERGLYCEMIA: Primary | ICD-10-CM

## 2024-11-27 DIAGNOSIS — E66.813 CLASS 3 SEVERE OBESITY DUE TO EXCESS CALORIES WITH SERIOUS COMORBIDITY AND BODY MASS INDEX (BMI) OF 40.0 TO 44.9 IN ADULT: ICD-10-CM

## 2024-11-27 DIAGNOSIS — E66.01 CLASS 3 SEVERE OBESITY DUE TO EXCESS CALORIES WITH SERIOUS COMORBIDITY AND BODY MASS INDEX (BMI) OF 40.0 TO 44.9 IN ADULT: ICD-10-CM

## 2024-11-27 LAB
ALBUMIN SERPL-MCNC: 4.6 G/DL (ref 4.1–5.1)
ALBUMIN/CREAT UR: 37 MG/G CREAT (ref 0–29)
ALP SERPL-CCNC: 81 IU/L (ref 44–121)
ALT SERPL-CCNC: 107 IU/L (ref 0–44)
APPEARANCE UR: CLEAR
AST SERPL-CCNC: 66 IU/L (ref 0–40)
BASOPHILS # BLD AUTO: 0.1 X10E3/UL (ref 0–0.2)
BASOPHILS NFR BLD AUTO: 1 %
BILIRUB SERPL-MCNC: 0.5 MG/DL (ref 0–1.2)
BILIRUB UR QL STRIP: NEGATIVE
BUN SERPL-MCNC: 20 MG/DL (ref 6–20)
BUN/CREAT SERPL: 18 (ref 9–20)
CALCIUM SERPL-MCNC: 9.6 MG/DL (ref 8.7–10.2)
CHLORIDE SERPL-SCNC: 98 MMOL/L (ref 96–106)
CHOLEST SERPL-MCNC: 239 MG/DL (ref 100–199)
CHOLEST/HDLC SERPL: 8.2 RATIO (ref 0–5)
CK SERPL-CCNC: 106 U/L (ref 49–439)
CO2 SERPL-SCNC: 25 MMOL/L (ref 20–29)
COLOR UR: YELLOW
CREAT SERPL-MCNC: 1.11 MG/DL (ref 0.76–1.27)
CREAT UR-MCNC: 127.4 MG/DL
EGFRCR SERPLBLD CKD-EPI 2021: 91 ML/MIN/1.73
EOSINOPHIL # BLD AUTO: 0.2 X10E3/UL (ref 0–0.4)
EOSINOPHIL NFR BLD AUTO: 2 %
ERYTHROCYTE [DISTWIDTH] IN BLOOD BY AUTOMATED COUNT: 13.7 % (ref 11.6–15.4)
GLOBULIN SER CALC-MCNC: 3.1 G/DL (ref 1.5–4.5)
GLUCOSE SERPL-MCNC: 211 MG/DL (ref 70–99)
GLUCOSE UR QL STRIP: ABNORMAL
HBA1C MFR BLD: 9.5 % (ref 4.8–5.6)
HCT VFR BLD AUTO: 55.8 % (ref 37.5–51)
HDLC SERPL-MCNC: 29 MG/DL
HGB BLD-MCNC: 17.5 G/DL (ref 13–17.7)
HGB UR QL STRIP: NEGATIVE
IMM GRANULOCYTES # BLD AUTO: 0 X10E3/UL (ref 0–0.1)
IMM GRANULOCYTES NFR BLD AUTO: 0 %
KETONES UR QL STRIP: NEGATIVE
LDLC SERPL CALC-MCNC: 117 MG/DL (ref 0–99)
LEUKOCYTE ESTERASE UR QL STRIP: NEGATIVE
LYMPHOCYTES # BLD AUTO: 1.7 X10E3/UL (ref 0.7–3.1)
LYMPHOCYTES NFR BLD AUTO: 24 %
MCH RBC QN AUTO: 28.8 PG (ref 26.6–33)
MCHC RBC AUTO-ENTMCNC: 31.4 G/DL (ref 31.5–35.7)
MCV RBC AUTO: 92 FL (ref 79–97)
MICROALBUMIN UR-MCNC: 47.4 UG/ML
MONOCYTES # BLD AUTO: 0.7 X10E3/UL (ref 0.1–0.9)
MONOCYTES NFR BLD AUTO: 9 %
NEUTROPHILS # BLD AUTO: 4.6 X10E3/UL (ref 1.4–7)
NEUTROPHILS NFR BLD AUTO: 64 %
NITRITE UR QL STRIP: NEGATIVE
PH UR STRIP: 5.5 [PH] (ref 5–7.5)
PLATELET # BLD AUTO: 222 X10E3/UL (ref 150–450)
POTASSIUM SERPL-SCNC: 5.2 MMOL/L (ref 3.5–5.2)
PROT SERPL-MCNC: 7.7 G/DL (ref 6–8.5)
PROT UR QL STRIP: ABNORMAL
RBC # BLD AUTO: 6.08 X10E6/UL (ref 4.14–5.8)
SODIUM SERPL-SCNC: 135 MMOL/L (ref 134–144)
SP GR UR STRIP: >=1.03 (ref 1–1.03)
TRIGL SERPL-MCNC: 528 MG/DL (ref 0–149)
TSH SERPL DL<=0.005 MIU/L-ACNC: 2.4 UIU/ML (ref 0.45–4.5)
UROBILINOGEN UR STRIP-MCNC: 0.2 MG/DL (ref 0.2–1)
VLDLC SERPL CALC-MCNC: 93 MG/DL (ref 5–40)
WBC # BLD AUTO: 7.3 X10E3/UL (ref 3.4–10.8)

## 2024-11-27 PROCEDURE — 99214 OFFICE O/P EST MOD 30 MIN: CPT | Performed by: FAMILY MEDICINE

## 2024-11-27 RX ORDER — SEMAGLUTIDE 0.5 MG/.5ML
0.5 INJECTION, SOLUTION SUBCUTANEOUS WEEKLY
Qty: 6 ML | Refills: 0 | Status: SHIPPED | OUTPATIENT
Start: 2024-11-27

## 2024-11-27 RX ORDER — DAPAGLIFLOZIN 10 MG/1
10 TABLET, FILM COATED ORAL DAILY
Start: 2024-11-27

## 2024-11-27 RX ORDER — GLIPIZIDE 5 MG/1
5 TABLET, FILM COATED, EXTENDED RELEASE ORAL DAILY
Qty: 30 TABLET | Refills: 12 | Status: SHIPPED | OUTPATIENT
Start: 2024-11-27

## 2024-11-27 RX ORDER — AMOXICILLIN 500 MG/1
500 TABLET, FILM COATED ORAL 3 TIMES DAILY
Qty: 30 TABLET | Refills: 0 | Status: SHIPPED | OUTPATIENT
Start: 2024-11-27

## 2024-11-27 NOTE — PROGRESS NOTES
Subjective   Bobby Rodriguez is a 33 y.o. male.   Chief Complaint   Patient presents with    Diabetes    URI     Diabetes  He presents for his follow-up diabetic visit. He has type 2 diabetes mellitus. His disease course has been worsening. Hypoglycemia symptoms include headaches. Associated symptoms include fatigue. Pertinent negatives for diabetes include no polyphagia and no polyuria.   URI   This is a new problem. The current episode started in the past 7 days (Monday). There has been no fever. Associated symptoms include coughing, headaches and sinus pain. Pertinent negatives include no diarrhea, ear pain, nausea, rash, sore throat, vomiting or wheezing. He has tried nothing for the symptoms.        The following portions of the patient's history were reviewed and updated as appropriate: allergies, current medications, past family history, past medical history, past social history, past surgical history, and problem list.    Past Medical History:   Diagnosis Date    Hyperlipidemia     Hypertension        Past Surgical History:   Procedure Laterality Date    EXCISION MASS LEG  2009    AVM at [facility]     SPINE SURGERY  2013    [facility]; Dr. Bell    TONSILLECTOMY  07/30/1999    Dr. Hyman        Family History   Problem Relation Age of Onset    Diabetes Father     Hyperlipidemia Father     Hypertension Father     Cancer Maternal Grandmother         Pancreatic at 82    Heart disease Maternal Grandfather     Cancer Paternal Grandfather         Pancreatic at 73        Social History     Socioeconomic History    Marital status: Single   Tobacco Use    Smoking status: Never    Smokeless tobacco: Current     Types: Chew    Tobacco comments:     1 can every 2 weeks   Vaping Use    Vaping status: Never Used   Substance and Sexual Activity    Alcohol use: Yes     Alcohol/week: 23.0 standard drinks of alcohol     Types: 20 Cans of beer, 3 Standard drinks or equivalent per week    Drug use: Never    Sexual activity: Yes      "Partners: Female     Birth control/protection: Condom, Pill       Outpatient Medications Prior to Visit   Medication Sig Dispense Refill    citalopram (CeleXA) 10 MG tablet TAKE 1 TABLET BY MOUTH DAILY. 30 tablet 0    lisinopril (PRINIVIL,ZESTRIL) 10 MG tablet Take 1 tablet by mouth Daily. 30 tablet 0    lovastatin (MEVACOR) 10 MG tablet Take 1 tablet by mouth Every Night. 30 tablet 0    dapagliflozin Propanediol (Farxiga) 10 MG tablet Take 10 mg by mouth Daily. 30 tablet 0    metFORMIN (GLUCOPHAGE) 500 MG tablet Take 1 tablet by mouth 2 (Two) Times a Day With Meals. (Patient taking differently: Take 1 tablet by mouth Daily With Breakfast.)      oseltamivir (TAMIFLU) 75 MG capsule Take 1 capsule by mouth 2 (Two) Times a Day. 10 capsule 0    Semaglutide-Weight Management (Wegovy) 0.5 MG/0.5ML solution auto-injector Inject 0.5 mL under the skin into the appropriate area as directed 1 (One) Time Per Week. (Patient not taking: Reported on 11/27/2024) 6 mL 3     No facility-administered medications prior to visit.        Review of Systems   Constitutional:  Positive for fatigue. Negative for fever.   HENT:  Positive for nosebleeds (3x yesterday and 2x today) and sinus pressure. Negative for ear pain, sore throat and voice change.    Eyes:  Negative for visual disturbance.   Respiratory:  Positive for cough. Negative for shortness of breath and wheezing.    Gastrointestinal:  Negative for diarrhea, nausea and vomiting.   Endocrine: Negative for polyphagia and polyuria.   Genitourinary:  Negative for frequency.   Musculoskeletal:  Negative for myalgias.   Skin:  Negative for rash and skin lesions.   Neurological:  Positive for headache. Negative for syncope and numbness.       Objective   Visit Vitals  /88 (BP Location: Left arm, Patient Position: Sitting, Cuff Size: Large Adult)   Pulse 101   Temp 97.1 °F (36.2 °C) (Temporal)   Resp 18   Ht 188 cm (74\")   Wt (!) 151 kg (333 lb 9.6 oz)   SpO2 96%   BMI 42.83 kg/m² "     Physical Exam  Vitals and nursing note reviewed.   Constitutional:       Appearance: He is well-developed.   HENT:      Head: Normocephalic.      Right Ear: Tympanic membrane, ear canal and external ear normal. No middle ear effusion. There is no impacted cerumen.      Left Ear: Tympanic membrane, ear canal and external ear normal.  No middle ear effusion. There is no impacted cerumen.      Nose: No septal deviation, mucosal edema or congestion.      Right Sinus: No maxillary sinus tenderness or frontal sinus tenderness.      Left Sinus: No maxillary sinus tenderness or frontal sinus tenderness.      Mouth/Throat:      Mouth: No oral lesions.      Pharynx: No oropharyngeal exudate.      Tonsils: No tonsillar abscesses.   Neck:      Thyroid: No thyromegaly.      Vascular: No carotid bruit.      Trachea: Trachea normal.   Cardiovascular:      Rate and Rhythm: Normal rate and regular rhythm.      Heart sounds: No murmur heard.     No friction rub. No gallop.   Pulmonary:      Effort: Pulmonary effort is normal. No respiratory distress.      Breath sounds: Normal breath sounds. No wheezing.   Chest:      Chest wall: No tenderness.   Musculoskeletal:      Cervical back: Neck supple.   Skin:     General: Skin is dry.      Findings: No rash.      Nails: There is no clubbing.   Neurological:      Mental Status: He is alert and oriented to person, place, and time.   Psychiatric:         Behavior: Behavior is cooperative.         Assessment & Plan   Problem List Items Addressed This Visit          High    Uncontrolled type 2 diabetes mellitus with hyperglycemia - Primary    Overview     12- dx  Farxiga is not covered by insurance and cost pt. $800 per month which she has been paying cash for, thus pt.     MAX DOSE Wegovy 2.4 ml weekly  MAX DOSE Farxiga 25 mg daily.         Current Assessment & Plan     -Extra visit for uncontrolled DM.  A1c done 11-26-24 and reviewed with pt.  A1c was 9.5 up from 7.4.  Worsening.    Advised to restart Wegovy 0.25 ML weekly for 1 month then increase to .5 ml weekly.  Start Glipizide 5 mg daily.  Benefits and risks of Glipizide discussed with patient including hypoglycemia and warned of symptoms.  Encouraged to watch sugar intake, exercise more and lose weight.   Non-compliant with medication due to not picking up medication yet.  Patient stated his wife is picking up his medication today.  Not monitoring sugar at home. Patient agrees to start taking blood sugar daily and bring in a blood sugar diary.  Follow up 12-9-24  Care management needs are self-addressed. Self-management abilities addressed and patient is capable of managing his own disease.           Relevant Medications    Semaglutide-Weight Management (Wegovy) 0.5 MG/0.5ML solution auto-injector    metFORMIN (GLUCOPHAGE) 500 MG tablet    glipizide (glipiZIDE XL) 5 MG ER tablet    dapagliflozin Propanediol (Farxiga) 10 MG tablet       Low    Class 3 severe obesity due to excess calories with serious comorbidity and body mass index (BMI) of 40.0 to 44.9 in adult    Overview     Diet exercise and wt loss encouraged consequences of obesity discussed          Current Assessment & Plan     Patient's (Body mass index is 42.83 kg/m².) indicates that they are morbidly/severely obese (BMI > 40 or > 35 with obesity - related health condition) with health conditions that include hypertension and diabetes mellitus . Weight is improving with lifestyle modifications. BMI  is above average; BMI management plan is completed. We discussed portion control and increasing exercise.             Unprioritized    Epistaxis    Current Assessment & Plan     New dx.  Mild probably 2nd to URI         URI (upper respiratory infection)    Current Assessment & Plan     New dx.  Viral versus bacterial.  Discussed use of Antibiotics, will give PRN order for Amoxicillin.  Benefits and risks discussed with pt.  Advised Claritin and Benadryl at HS.  Patient declines  testing for Flu and Covid         Relevant Medications    amoxicillin (AMOXIL) 500 MG tablet

## 2024-11-27 NOTE — ASSESSMENT & PLAN NOTE
-Extra visit for uncontrolled DM.  A1c done 11-26-24 and reviewed with pt.  A1c was 9.5 up from 7.4.  Worsening.   Advised to restart Wegovy 0.25 ML weekly for 1 month then increase to .5 ml weekly.  Start Glipizide 5 mg daily.  Benefits and risks of Glipizide discussed with patient including hypoglycemia and warned of symptoms.  Encouraged to watch sugar intake, exercise more and lose weight.   Non-compliant with medication due to not picking up medication yet.  Patient stated his wife is picking up his medication today.  Not monitoring sugar at home. Patient agrees to start taking blood sugar daily and bring in a blood sugar diary.  Follow up 12-9-24  Care management needs are self-addressed. Self-management abilities addressed and patient is capable of managing his own disease.

## 2024-11-27 NOTE — TELEPHONE ENCOUNTER
LMOM that DM was much worse and Dr. Nielsen wants to follow either today or Monday, to please return call so we can get him scheduled

## 2024-11-27 NOTE — ASSESSMENT & PLAN NOTE
New dx.  Viral versus bacterial.  Discussed use of Antibiotics, will give PRN order for Amoxicillin.  Benefits and risks discussed with pt.  Advised Claritin and Benadryl at HS.  Patient declines testing for Flu and Covid

## 2024-11-27 NOTE — ASSESSMENT & PLAN NOTE
Patient's (Body mass index is 42.83 kg/m².) indicates that they are morbidly/severely obese (BMI > 40 or > 35 with obesity - related health condition) with health conditions that include hypertension and diabetes mellitus . Weight is improving with lifestyle modifications. BMI  is above average; BMI management plan is completed. We discussed portion control and increasing exercise.    No

## 2024-12-09 ENCOUNTER — OFFICE VISIT (OUTPATIENT)
Dept: FAMILY MEDICINE CLINIC | Facility: CLINIC | Age: 34
End: 2024-12-09
Payer: COMMERCIAL

## 2024-12-09 VITALS
WEIGHT: 315 LBS | HEIGHT: 75 IN | SYSTOLIC BLOOD PRESSURE: 128 MMHG | BODY MASS INDEX: 39.17 KG/M2 | DIASTOLIC BLOOD PRESSURE: 82 MMHG | TEMPERATURE: 97.1 F | HEART RATE: 102 BPM | RESPIRATION RATE: 18 BRPM | OXYGEN SATURATION: 97 %

## 2024-12-09 DIAGNOSIS — E16.2 HYPOGLYCEMIA: ICD-10-CM

## 2024-12-09 DIAGNOSIS — Z00.01 ENCOUNTER FOR GENERAL ADULT MEDICAL EXAMINATION WITH ABNORMAL FINDINGS: ICD-10-CM

## 2024-12-09 DIAGNOSIS — E66.813 CLASS 3 SEVERE OBESITY DUE TO EXCESS CALORIES WITH SERIOUS COMORBIDITY AND BODY MASS INDEX (BMI) OF 40.0 TO 44.9 IN ADULT: ICD-10-CM

## 2024-12-09 DIAGNOSIS — E11.21 DIABETIC NEPHROPATHY ASSOCIATED WITH TYPE 2 DIABETES MELLITUS: ICD-10-CM

## 2024-12-09 DIAGNOSIS — J06.9 UPPER RESPIRATORY TRACT INFECTION, UNSPECIFIED TYPE: ICD-10-CM

## 2024-12-09 DIAGNOSIS — D75.1 POLYCYTHEMIA: ICD-10-CM

## 2024-12-09 DIAGNOSIS — E11.65 UNCONTROLLED TYPE 2 DIABETES MELLITUS WITH HYPERGLYCEMIA: Primary | ICD-10-CM

## 2024-12-09 DIAGNOSIS — M79.10 MYALGIA: ICD-10-CM

## 2024-12-09 DIAGNOSIS — R73.9 HYPERGLYCEMIA: ICD-10-CM

## 2024-12-09 DIAGNOSIS — I10 PRIMARY HYPERTENSION: ICD-10-CM

## 2024-12-09 DIAGNOSIS — L91.8 SKIN TAGS, MULTIPLE ACQUIRED: ICD-10-CM

## 2024-12-09 DIAGNOSIS — F43.9 SITUATIONAL STRESS: ICD-10-CM

## 2024-12-09 DIAGNOSIS — R74.8 ABNORMAL LIVER ENZYMES: ICD-10-CM

## 2024-12-09 DIAGNOSIS — E66.01 CLASS 3 SEVERE OBESITY DUE TO EXCESS CALORIES WITH SERIOUS COMORBIDITY AND BODY MASS INDEX (BMI) OF 40.0 TO 44.9 IN ADULT: ICD-10-CM

## 2024-12-09 DIAGNOSIS — Z71.85 IMMUNIZATION COUNSELING: ICD-10-CM

## 2024-12-09 DIAGNOSIS — J10.1 INFLUENZA A: ICD-10-CM

## 2024-12-09 DIAGNOSIS — R06.83 SNORING: ICD-10-CM

## 2024-12-09 DIAGNOSIS — M51.26 DISPLACEMENT OF LUMBAR INTERVERTEBRAL DISC WITHOUT MYELOPATHY: ICD-10-CM

## 2024-12-09 DIAGNOSIS — M51.46: ICD-10-CM

## 2024-12-09 DIAGNOSIS — F51.3 SLEEP WALKING: ICD-10-CM

## 2024-12-09 DIAGNOSIS — R20.2 PARESTHESIA: ICD-10-CM

## 2024-12-09 DIAGNOSIS — R35.1 NOCTURIA: ICD-10-CM

## 2024-12-09 DIAGNOSIS — E87.5 HYPERKALEMIA: ICD-10-CM

## 2024-12-09 DIAGNOSIS — R04.0 EPISTAXIS: ICD-10-CM

## 2024-12-09 DIAGNOSIS — F41.9 ANXIETY: ICD-10-CM

## 2024-12-09 DIAGNOSIS — E78.2 MIXED HYPERLIPIDEMIA: ICD-10-CM

## 2024-12-09 DIAGNOSIS — R53.83 LETHARGY: ICD-10-CM

## 2024-12-09 PROBLEM — R79.89 ELEVATED LIVER FUNCTION TESTS: Status: ACTIVE | Noted: 2024-12-09

## 2024-12-09 PROBLEM — R79.89 ELEVATED LIVER FUNCTION TESTS: Status: RESOLVED | Noted: 2024-12-09 | Resolved: 2024-12-09

## 2024-12-09 PROCEDURE — 99395 PREV VISIT EST AGE 18-39: CPT | Performed by: FAMILY MEDICINE

## 2024-12-09 PROCEDURE — 99214 OFFICE O/P EST MOD 30 MIN: CPT | Performed by: FAMILY MEDICINE

## 2024-12-09 RX ORDER — CITALOPRAM HYDROBROMIDE 10 MG/1
10 TABLET ORAL DAILY
Qty: 90 TABLET | Refills: 3 | Status: SHIPPED | OUTPATIENT
Start: 2024-12-09

## 2024-12-09 RX ORDER — GLIPIZIDE 5 MG/1
5 TABLET, FILM COATED, EXTENDED RELEASE ORAL DAILY
Qty: 90 TABLET | Refills: 3 | Status: SHIPPED | OUTPATIENT
Start: 2024-12-09

## 2024-12-09 RX ORDER — LISINOPRIL 10 MG/1
10 TABLET ORAL DAILY
Qty: 90 TABLET | Refills: 3 | Status: SHIPPED | OUTPATIENT
Start: 2024-12-09

## 2024-12-09 RX ORDER — DAPAGLIFLOZIN 10 MG/1
10 TABLET, FILM COATED ORAL DAILY
Qty: 90 TABLET | Refills: 3 | Status: SHIPPED | OUTPATIENT
Start: 2024-12-09

## 2024-12-09 NOTE — ASSESSMENT & PLAN NOTE
Normal CPK.  CPK done 11-26-24, read by me, reviewed with pt.  CPK was 106.  Statin being stopped due to elevated liver functions.  He is to see how the myalgias do

## 2024-12-09 NOTE — ASSESSMENT & PLAN NOTE
A1c and Microalbumin/creatinine ratio done 11-26-24, read by me, reviewed with pt.  A1c was 9.5 up from 7.4, Microalbumin/creatinine ratio was 37 up from 23  Worsening.   Encouraged to watch sugar intake, exercise more and lose weight.   Compliant with medication.  Patient tolerated Metformin, Farxiga and Glipizide well without side effects. I feel the benefits of the medication outweigh the risks.  Will hold on restarting Wegovy.  Monitoring sugar at home.   Follow up in 1 months  Care management needs are self-addressed. Self-management abilities addressed and patient is capable of managing his own disease.  Diabetic foot exam:   Left: Filament test present   Pulses Dorsalis Pedis:  present  Posterior Tibial:  present   Reflexes 1+    Vibratory sensation normal   Proprioception normal   Sharp/dull discrimination normal       Right: Filament test present   Pulses Dorsalis Pedis:  present  Posterior Tibial:  present   Reflexes 1+    Vibratory sensation normal   Proprioception normal   Sharp/dull discrimination normal

## 2024-12-09 NOTE — ASSESSMENT & PLAN NOTE
New dx.  CMP done 11-26-24, read by me, reviewed with pt.  AST was 66 up from 34, ALT was 107 up from 71

## 2024-12-09 NOTE — ASSESSMENT & PLAN NOTE
New dx.  CBC done 11-26-24, read by me, reviewed with pt.  RBC 6.08 up from 5.69, HCT 55.8 up from 50.8, MCHC 31.4 down from 32.9.  Will repeat with next labs.

## 2024-12-09 NOTE — ASSESSMENT & PLAN NOTE
Patient's (Body mass index is 41.85 kg/m².) indicates that they are morbidly/severely obese (BMI > 40 or > 35 with obesity - related health condition) with health conditions that include hypertension, diabetes mellitus, and dyslipidemias . Weight is improving with lifestyle modifications. BMI  is above average; BMI management plan is completed. We discussed increasing exercise.

## 2024-12-09 NOTE — ASSESSMENT & PLAN NOTE
Intermittent and moderate-severe.  Worse since last seen but resolved at present.  Patient declines treatment at present

## 2024-12-09 NOTE — ASSESSMENT & PLAN NOTE
Stable.   Patient tolerated Celexa well without side effects. I feel the benefits of the medication outweigh the risks.

## 2024-12-09 NOTE — ASSESSMENT & PLAN NOTE
Worse.  CMP done 11-26-24, read by me, reviewed with pt.  AST was 66 up from 34, ALT was 107 up from 71.  Offered referral to GI, pt. Declines.

## 2024-12-09 NOTE — ASSESSMENT & PLAN NOTE
Lipid and CMP done 11-26-24, read by me, reviewed with pt.  Trig. 528 down from 557, Tot. Chol. 239 up from 225, HDL 29 same as last,  up from 101  Worsening.  Trial off Lovastatin due to elevated liver functions   Encouraged to watch fatty intake, exercise more, and lose weight.   Compliant with medication.    Goals developed at last visit were not met   Follow up in 2-3  months  Care management needs are self-addressed. Self-management abilities addressed and patient is capable of managing his own disease.

## 2024-12-09 NOTE — ASSESSMENT & PLAN NOTE
New dx.  A1c and Microalbumin/creatinine ratio done 11-26-24, read by me, reviewed with pt.  A1c was 9.5 up from 7.4, Microalbumin/creatinine ratio was 37 up from 23.  Controlled diabetes well and followed closely

## 2024-12-09 NOTE — PROGRESS NOTES
"Subjective   Bobby Rodriguez is a 34 y.o. male here for his annual physical with me. Bobby is here for coordination of medical care, to discuss health maintenance, disease prevention as well as to followup on medical problems. Patient has been followed by me since 2004. Patient's last CPE was 12-5-23. Activity level is heavy. Exercises 0 per week. Appetite is good. Feels well with many complaints. Energy level is fair. Sleeps well. Patient's last colonoscopy was never due to age. He is advised to have colonoscopy at 47.   Patient is doing routine self skin exam monthly. Patient is doing routine self-breast exams monthly. Patient is checking testicles monthly.    No results found for: \"PSA\"     History of Present Illness  Follow up new diagnosis of diabetic nephrology.    Follow up new diagnosis of Polycythemia.  Hyperlipidemia  This is a chronic problem. The current episode started more than 1 year ago. The problem is controlled. Recent lipid tests were reviewed and are high. Exacerbating diseases include diabetes and obesity. Factors aggravating his hyperlipidemia include fatty foods. Pertinent negatives include no chest pain, myalgias or shortness of breath. Current antihyperlipidemic treatment includes statins. The current treatment provides no improvement of lipids.   Anxiety  Presents for follow-up visit.  Patient reports no chest pain, decreased concentration, depressed mood, dizziness, nausea, nervous/anxious behavior, palpitations or shortness of breath. The severity of symptoms is mild. The treatment provided significant relief. Compliance with medications is %.   Abnormal Lab  Today's concern : Elevated liver functions.   Symptoms are chronic.   Symptoms have been worse since onset.   Pertinent negative symptoms include no abdominal pain, no chest pain, no chills, no congestion, no cough, no fatigue, no fever, no myalgias, no nausea, no neck pain, no rash, no sore throat, no dysuria, no vomiting " and no weakness.        The following portions of the patient's history were reviewed and updated as appropriate: allergies, current medications, past family history, past medical history, past social history, past surgical history, and problem list.    Past Medical History:   Diagnosis Date    Hyperlipidemia     Hypertension        Family History   Problem Relation Age of Onset    Diabetes Father     Hyperlipidemia Father     Hypertension Father     Cancer Maternal Grandmother         Pancreatic at 82    Heart disease Maternal Grandfather     Cancer Paternal Grandfather         Pancreatic at 73       Social History     Socioeconomic History    Marital status: Single   Tobacco Use    Smoking status: Never    Smokeless tobacco: Current     Types: Chew    Tobacco comments:     1 can every 2 weeks   Vaping Use    Vaping status: Never Used   Substance and Sexual Activity    Alcohol use: Yes     Alcohol/week: 15.0 standard drinks of alcohol     Types: 12 Cans of beer, 3 Standard drinks or equivalent per week    Drug use: Never    Sexual activity: Yes     Partners: Female     Birth control/protection: Condom, Pill       Social History     Social History Narrative     1 x .  Son born 7- and 1 daughter 3-.   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  Exercise none other than at work.  Caffeine 1 glasses tea daily.  Wears seatbelts all the time.  Hobbies boating, yard work and outdoors.         Past Surgical History:   Procedure Laterality Date    EXCISION MASS LEG  2009    [facility] at [facility]     SPINE SURGERY  2013    [facility]; Dr. Bell    TONSILLECTOMY  07/30/1999    Dr. Hyman       Current Outpatient Medications on File Prior to Visit   Medication Sig Dispense Refill    lovastatin (MEVACOR) 10 MG tablet Take 1 tablet by mouth Every Night. 30 tablet 0     No current facility-administered medications on file prior to visit.       No Known Allergies    Review of  "Systems   Constitutional:  Negative for appetite change, chills, fatigue, fever, unexpected weight gain and unexpected weight loss.   HENT:  Negative for congestion, dental problem, ear discharge, ear pain, hearing loss, nosebleeds, postnasal drip, rhinorrhea, sinus pressure, sneezing, sore throat, tinnitus and voice change.         Last dental exam 8-2024 with Dr. Talley-Doing well   Eyes:  Negative for blurred vision, double vision, pain, redness and visual disturbance.        Last vision exam  with Dr. Pham-doing well   Respiratory:  Negative for cough, shortness of breath, wheezing and stridor.    Cardiovascular:  Negative for chest pain, palpitations and leg swelling.   Gastrointestinal:  Negative for abdominal pain, anal bleeding, blood in stool, constipation, diarrhea, nausea, rectal pain, vomiting, GERD and indigestion.        7 BM weekly   Endocrine: Negative for cold intolerance, heat intolerance, polydipsia, polyphagia and polyuria.        Sex Drive  He is a 9  She is a 5   Genitourinary:  Negative for difficulty urinating, dysuria, frequency, hematuria and urgency.   Musculoskeletal:  Negative for back pain, joint swelling, myalgias, neck pain and neck stiffness.   Skin:  Negative for color change, dry skin and rash.   Neurological:  Negative for dizziness, syncope, speech difficulty, weakness, light-headedness, headache and memory problem.   Hematological:  Does not bruise/bleed easily.   Psychiatric/Behavioral:  Negative for decreased concentration, sleep disturbance, depressed mood and stress. The patient is not nervous/anxious.        Objective   Visit Vitals  /82 (BP Location: Left arm, Patient Position: Sitting, Cuff Size: Large Adult)   Pulse 102   Temp 97.1 °F (36.2 °C) (Temporal)   Resp 18   Ht 190.5 cm (75\")   Wt (!) 152 kg (334 lb 12.8 oz)   SpO2 97%   BMI 41.85 kg/m²     Physical Exam  Constitutional:       General: He is not in acute distress.     Appearance: He is " well-developed. He is not diaphoretic.   HENT:      Head: Normocephalic.      Right Ear: Hearing, tympanic membrane, ear canal and external ear normal.      Left Ear: Hearing, tympanic membrane, ear canal and external ear normal.      Nose: Nose normal.      Mouth/Throat:      Lips: Pink.      Mouth: Mucous membranes are moist.      Pharynx: Oropharynx is clear. No oropharyngeal exudate.   Eyes:      General: Lids are normal. No scleral icterus.        Right eye: No discharge.         Left eye: No discharge.      Extraocular Movements: Extraocular movements intact.      Conjunctiva/sclera: Conjunctivae normal.      Pupils: Pupils are equal, round, and reactive to light.   Neck:      Trachea: Trachea normal.   Cardiovascular:      Rate and Rhythm: Normal rate and regular rhythm.      Pulses:           Dorsalis pedis pulses are 1+ on the right side and 1+ on the left side.        Posterior tibial pulses are 1+ on the right side and 1+ on the left side.      Heart sounds: Normal heart sounds. No murmur heard.  Pulmonary:      Effort: Pulmonary effort is normal.      Breath sounds: Normal breath sounds. No wheezing.   Chest:      Chest wall: No tenderness.   Breasts:     Right: Normal. No swelling, bleeding, inverted nipple, mass, nipple discharge, skin change or tenderness.      Left: Normal. No swelling, bleeding, inverted nipple, mass, nipple discharge, skin change or tenderness.   Abdominal:      General: Bowel sounds are normal. There is no distension.      Palpations: Abdomen is soft. There is no mass.      Tenderness: There is no abdominal tenderness.      Hernia: No hernia is present.   Genitourinary:     Penis: Normal and circumcised. No tenderness.       Testes: Normal.      Prostate: Normal.      Rectum: Normal.   Musculoskeletal:         General: No tenderness or deformity. Normal range of motion.      Cervical back: Full passive range of motion without pain, normal range of motion and neck supple.    Lymphadenopathy:      Cervical: No cervical adenopathy.   Skin:     General: Skin is warm and dry.      Findings: No erythema or rash.      Comments: Skin tags bilateral axillae and neck.  Scar lower back.  Nevus's scattered over the back.   Neurological:      General: No focal deficit present.      Mental Status: He is alert and oriented to person, place, and time.      Cranial Nerves: Cranial nerves 2-12 are intact. No cranial nerve deficit.      Sensory: Sensation is intact. No sensory deficit.      Motor: Motor function is intact. No abnormal muscle tone.      Coordination: Coordination is intact. Coordination normal.      Gait: Gait is intact.      Deep Tendon Reflexes: Reflexes normal.   Psychiatric:         Behavior: Behavior normal.         Thought Content: Thought content normal.         Judgment: Judgment normal.         Assessment & Plan   Problem List Items Addressed This Visit          High    Uncontrolled type 2 diabetes mellitus with hyperglycemia - Primary    Overview     Diabetic foot exam done today and WNL.  Patient has promised to walk for 30 minutes daily.    12- dx  Farxiga is not covered by insurance and cost pt. $800 per month which she has been paying cash    MAX DOSE Wegovy 2.4 ml weekly  MAX DOSE Farxiga 25 mg daily.         Current Assessment & Plan     A1c and Microalbumin/creatinine ratio done 11-26-24, read by me, reviewed with pt.  A1c was 9.5 up from 7.4, Microalbumin/creatinine ratio was 37 up from 23  Worsening.   Encouraged to watch sugar intake, exercise more and lose weight.   Compliant with medication.  Patient tolerated Metformin, Farxiga and Glipizide well without side effects. I feel the benefits of the medication outweigh the risks.  Will hold on restarting Wegovy.  Monitoring sugar at home.   Follow up in 1 months  Care management needs are self-addressed. Self-management abilities addressed and patient is capable of managing his own disease.  Diabetic foot exam:    Left: Filament test present   Pulses Dorsalis Pedis:  present  Posterior Tibial:  present   Reflexes 1+    Vibratory sensation normal   Proprioception normal   Sharp/dull discrimination normal       Right: Filament test present   Pulses Dorsalis Pedis:  present  Posterior Tibial:  present   Reflexes 1+    Vibratory sensation normal   Proprioception normal   Sharp/dull discrimination normal            Relevant Medications    dapagliflozin Propanediol (Farxiga) 10 MG tablet    glipizide (glipiZIDE XL) 5 MG ER tablet    metFORMIN (GLUCOPHAGE) 500 MG tablet    Other Relevant Orders    Hemoglobin A1c       Medium    Abnormal liver enzymes    Overview     Patient has decreased alcohol intake.         Current Assessment & Plan     Worse.  CMP done 11-26-24, read by me, reviewed with pt.  AST was 66 up from 34, ALT was 107 up from 71.  Offered referral to GI, pt. Declines.         Anxiety    Current Assessment & Plan     Doing well.  Patient tolerated Celexa well without side effects. I feel the benefits of the medication outweigh the risks.          Relevant Medications    citalopram (CeleXA) 10 MG tablet    Diabetic nephropathy    Current Assessment & Plan     New dx.  A1c and Microalbumin/creatinine ratio done 11-26-24, read by me, reviewed with pt.  A1c was 9.5 up from 7.4, Microalbumin/creatinine ratio was 37 up from 23.  Controlled diabetes well and followed closely           Relevant Medications    dapagliflozin Propanediol (Farxiga) 10 MG tablet    glipizide (glipiZIDE XL) 5 MG ER tablet    metFORMIN (GLUCOPHAGE) 500 MG tablet    Displacement of lumbar intervertebral disc without myelopathy    Overview     Hx of diskectomy,         Current Assessment & Plan     Intermittent and moderate-severe.  Worse since last seen but resolved at present.  Patient declines treatment at present         Hypertension    Current Assessment & Plan     Doing well.  Patient tolerated Lisinopril well without side effects. I feel the  benefits of the medication outweigh the risks.   Encouraged to watch salt, exercise more and lose weight.           Relevant Medications    lisinopril (PRINIVIL,ZESTRIL) 10 MG tablet    Mixed hyperlipidemia    Current Assessment & Plan     Lipid and CMP done 11-26-24, read by me, reviewed with pt.  Trig. 528 down from 557, Tot. Chol. 239 up from 225, HDL 29 same as last,  up from 101  Worsening.  Trial off Lovastatin due to elevated liver functions   Encouraged to watch fatty intake, exercise more, and lose weight.   Compliant with medication.    Goals developed at last visit were not met   Follow up in 2-3  months  Care management needs are self-addressed. Self-management abilities addressed and patient is capable of managing his own disease.           Relevant Orders    Lipid Panel With / Chol / HDL Ratio    Comprehensive Metabolic Panel       Low    Class 3 severe obesity due to excess calories with serious comorbidity and body mass index (BMI) of 40.0 to 44.9 in adult    Overview     Diet exercise and wt loss encouraged consequences of obesity discussed          Current Assessment & Plan     Patient's (Body mass index is 41.85 kg/m².) indicates that they are morbidly/severely obese (BMI > 40 or > 35 with obesity - related health condition) with health conditions that include hypertension, diabetes mellitus, and dyslipidemias . Weight is improving with lifestyle modifications. BMI  is above average; BMI management plan is completed. We discussed increasing exercise.          RESOLVED: Hyperglycemia    Current Assessment & Plan     DM thus delete.  CMP done 11-26-24, read by me, reviewed with pt.  Glucose was 211 up from 162         Hyperkalemia    Current Assessment & Plan     Resolved x 2.  CMP done 11-26-24, read by me, reviewed with pt.  Potassium was 5.2 up from 5.0         Sleep walking    Current Assessment & Plan     Doing well.            Unprioritized    Encounter for general adult medical  examination with abnormal findings    Current Assessment & Plan     Encouraged to do self-breast exam, self-testicle exams, and self derm exams. Congratulated on using seat belts.  Encouraged to do annual physical exams.  Immunization status reviewed.           Epistaxis    Current Assessment & Plan     Resolved         Hypoglycemia    Current Assessment & Plan     Borderline blood sugars should improve as body adjusts to lower blood sugars.         Immunization counseling    Overview     T-Dap 6-         Current Assessment & Plan     T-Dap 6-  Pt. Declines flu and covid shots         RESOLVED: Influenza A    Current Assessment & Plan     Resolved thus delete.          Lethargy    Current Assessment & Plan     Mild.  TSH done 11-26-24, read by me, reviewed with pt.  TSH was normal         Myalgia    Current Assessment & Plan     Normal CPK.  CPK done 11-26-24, read by me, reviewed with pt.  CPK was 106.  Statin being stopped due to elevated liver functions.  He is to see how the myalgias do         Nocturia    Current Assessment & Plan     Mild and stable.  U/A done 11-26-24, read by me, reviewed with pt.  Trace protein and 2 + glucose         Paresthesia    Current Assessment & Plan     Mild, pt. Declines nerve conductions and treatment.         Polycythemia    Current Assessment & Plan     New dx.  CBC done 11-26-24, read by me, reviewed with pt.  RBC 6.08 up from 5.69, HCT 55.8 up from 50.8, MCHC 31.4 down from 32.9.  Will repeat with next labs.         Relevant Orders    CBC & Differential    Schmorl's nodes-lumbar region    Current Assessment & Plan     Stable.         Situational stress    Current Assessment & Plan     Stable.   Patient tolerated Celexa well without side effects. I feel the benefits of the medication outweigh the risks.          Relevant Medications    citalopram (CeleXA) 10 MG tablet    Skin tags, multiple acquired    Current Assessment & Plan     Mild and stable         Snoring     Current Assessment & Plan     Stable, discussed sleep studies, pt. Declines.         RESOLVED: URI (upper respiratory infection)    Current Assessment & Plan     Resolved thus delete.                       Encouraged to check his skin, testicles and breasts monthly. Reviewed exercising regularly, eating a balanced diet, immunizations and if due, patient counselled to check with insurance company for coverage;, and regularly checking skin and breasts.

## 2025-01-14 ENCOUNTER — OFFICE VISIT (OUTPATIENT)
Dept: FAMILY MEDICINE CLINIC | Facility: CLINIC | Age: 35
End: 2025-01-14
Payer: COMMERCIAL

## 2025-01-14 VITALS
BODY MASS INDEX: 39.17 KG/M2 | OXYGEN SATURATION: 98 % | DIASTOLIC BLOOD PRESSURE: 87 MMHG | HEIGHT: 75 IN | RESPIRATION RATE: 18 BRPM | WEIGHT: 315 LBS | SYSTOLIC BLOOD PRESSURE: 123 MMHG | TEMPERATURE: 96.9 F | HEART RATE: 89 BPM

## 2025-01-14 DIAGNOSIS — D75.1 POLYCYTHEMIA: ICD-10-CM

## 2025-01-14 DIAGNOSIS — E87.5 HYPERKALEMIA: ICD-10-CM

## 2025-01-14 DIAGNOSIS — E78.2 MIXED HYPERLIPIDEMIA: ICD-10-CM

## 2025-01-14 DIAGNOSIS — F41.9 ANXIETY: ICD-10-CM

## 2025-01-14 DIAGNOSIS — E11.65 UNCONTROLLED TYPE 2 DIABETES MELLITUS WITH HYPERGLYCEMIA: Primary | ICD-10-CM

## 2025-01-14 DIAGNOSIS — F43.9 SITUATIONAL STRESS: ICD-10-CM

## 2025-01-14 DIAGNOSIS — R74.8 ABNORMAL LIVER ENZYMES: ICD-10-CM

## 2025-01-14 DIAGNOSIS — I10 PRIMARY HYPERTENSION: ICD-10-CM

## 2025-01-14 DIAGNOSIS — E11.21 DIABETIC NEPHROPATHY ASSOCIATED WITH TYPE 2 DIABETES MELLITUS: ICD-10-CM

## 2025-01-14 DIAGNOSIS — E66.01 CLASS 3 SEVERE OBESITY DUE TO EXCESS CALORIES WITH SERIOUS COMORBIDITY AND BODY MASS INDEX (BMI) OF 40.0 TO 44.9 IN ADULT: ICD-10-CM

## 2025-01-14 DIAGNOSIS — E66.813 CLASS 3 SEVERE OBESITY DUE TO EXCESS CALORIES WITH SERIOUS COMORBIDITY AND BODY MASS INDEX (BMI) OF 40.0 TO 44.9 IN ADULT: ICD-10-CM

## 2025-01-14 LAB
ALBUMIN SERPL-MCNC: 4.9 G/DL (ref 4.1–5.1)
ALP SERPL-CCNC: 88 IU/L (ref 44–121)
ALT SERPL-CCNC: 96 IU/L (ref 0–44)
AST SERPL-CCNC: 47 IU/L (ref 0–40)
BASOPHILS # BLD AUTO: 0.1 X10E3/UL (ref 0–0.2)
BASOPHILS NFR BLD AUTO: 1 %
BILIRUB SERPL-MCNC: 0.6 MG/DL (ref 0–1.2)
BUN SERPL-MCNC: 18 MG/DL (ref 6–20)
BUN/CREAT SERPL: 15 (ref 9–20)
CALCIUM SERPL-MCNC: 10 MG/DL (ref 8.7–10.2)
CHLORIDE SERPL-SCNC: 98 MMOL/L (ref 96–106)
CHOLEST SERPL-MCNC: 255 MG/DL (ref 100–199)
CHOLEST/HDLC SERPL: 8.5 RATIO (ref 0–5)
CO2 SERPL-SCNC: 22 MMOL/L (ref 20–29)
CREAT SERPL-MCNC: 1.17 MG/DL (ref 0.76–1.27)
EGFRCR SERPLBLD CKD-EPI 2021: 85 ML/MIN/1.73
EOSINOPHIL # BLD AUTO: 0.3 X10E3/UL (ref 0–0.4)
EOSINOPHIL NFR BLD AUTO: 3 %
ERYTHROCYTE [DISTWIDTH] IN BLOOD BY AUTOMATED COUNT: 13.2 % (ref 11.6–15.4)
GLOBULIN SER CALC-MCNC: 3 G/DL (ref 1.5–4.5)
GLUCOSE SERPL-MCNC: 167 MG/DL (ref 70–99)
HBA1C MFR BLD: 9.1 % (ref 4.8–5.6)
HCT VFR BLD AUTO: 54.6 % (ref 37.5–51)
HDLC SERPL-MCNC: 30 MG/DL
HGB BLD-MCNC: 18.1 G/DL (ref 13–17.7)
IMM GRANULOCYTES # BLD AUTO: 0 X10E3/UL (ref 0–0.1)
IMM GRANULOCYTES NFR BLD AUTO: 0 %
LDLC SERPL CALC-MCNC: 123 MG/DL (ref 0–99)
LYMPHOCYTES # BLD AUTO: 2.2 X10E3/UL (ref 0.7–3.1)
LYMPHOCYTES NFR BLD AUTO: 28 %
MCH RBC QN AUTO: 29.4 PG (ref 26.6–33)
MCHC RBC AUTO-ENTMCNC: 33.2 G/DL (ref 31.5–35.7)
MCV RBC AUTO: 89 FL (ref 79–97)
MONOCYTES # BLD AUTO: 0.6 X10E3/UL (ref 0.1–0.9)
MONOCYTES NFR BLD AUTO: 8 %
NEUTROPHILS # BLD AUTO: 4.7 X10E3/UL (ref 1.4–7)
NEUTROPHILS NFR BLD AUTO: 60 %
PLATELET # BLD AUTO: 224 X10E3/UL (ref 150–450)
POTASSIUM SERPL-SCNC: 4.9 MMOL/L (ref 3.5–5.2)
PROT SERPL-MCNC: 7.9 G/DL (ref 6–8.5)
RBC # BLD AUTO: 6.16 X10E6/UL (ref 4.14–5.8)
SODIUM SERPL-SCNC: 136 MMOL/L (ref 134–144)
TRIGL SERPL-MCNC: 568 MG/DL (ref 0–149)
VLDLC SERPL CALC-MCNC: 102 MG/DL (ref 5–40)
WBC # BLD AUTO: 7.8 X10E3/UL (ref 3.4–10.8)

## 2025-01-14 PROCEDURE — 99214 OFFICE O/P EST MOD 30 MIN: CPT | Performed by: FAMILY MEDICINE

## 2025-01-14 RX ORDER — GLIPIZIDE 5 MG/1
5 TABLET, FILM COATED, EXTENDED RELEASE ORAL DAILY
Qty: 30 TABLET | Refills: 5 | Status: SHIPPED | OUTPATIENT
Start: 2025-01-14

## 2025-01-14 RX ORDER — CITALOPRAM HYDROBROMIDE 10 MG/1
10 TABLET ORAL DAILY
Start: 2025-01-14

## 2025-01-14 RX ORDER — LISINOPRIL 10 MG/1
10 TABLET ORAL DAILY
Start: 2025-01-14

## 2025-01-14 RX ORDER — DAPAGLIFLOZIN 10 MG/1
10 TABLET, FILM COATED ORAL DAILY
Start: 2025-01-14

## 2025-01-14 RX ORDER — LOVASTATIN 20 MG/1
20 TABLET ORAL NIGHTLY
Qty: 30 TABLET | Refills: 5 | Status: SHIPPED | OUTPATIENT
Start: 2025-01-14

## 2025-01-14 NOTE — ASSESSMENT & PLAN NOTE
Patient's (Body mass index is 41.72 kg/m².) indicates that they are morbidly/severely obese (BMI > 40 or > 35 with obesity - related health condition) with health conditions that include hypertension, diabetes mellitus, and dyslipidemias . Weight is improving with lifestyle modifications. BMI  is above average; BMI management plan is completed. We discussed portion control and increasing exercise.

## 2025-01-14 NOTE — ASSESSMENT & PLAN NOTE
Doing well.  He tolerates lisinopril well without side effects.  Benefits outweigh the risk  Encouraged to watch salt, exercise more and lose weight.

## 2025-01-14 NOTE — ASSESSMENT & PLAN NOTE
A1c done 1-, read by me, reviewed with pt. A1c is down to 9.1 from 9.5 and is still uncontrolled  Improved.  Not at goal.  Restart Glipizide 5 mg daily..  Encouraged to watch sugar intake, exercise more and lose weight.   Non-compliant with medication, pt. Was taking wrong dose of Farxiga and will increase to Farxiga 10 mg.   Monitoring sugar at home.   Follow up in 1 months  Care management needs are self-addressed. Self-management abilities addressed and patient is capable of managing his own disease.

## 2025-01-14 NOTE — ASSESSMENT & PLAN NOTE
Lipid and CMP done 1-, read by me, reviewed with pt.  Worsening.   Increase lovastatin to 20 mg daily.  Encouraged to watch fatty intake, exercise more, and lose weight.   No medication due to stopping statin due to elevated liver functions.    Is not getting adequate diet and exercise  Goals developed at last visit were not met   Follow up in 2-3  months  Care management needs are self-addressed. Self-management abilities addressed and patient is capable of managing his own disease.

## 2025-01-14 NOTE — PROGRESS NOTES
Subjective   Bobby Rodriguez is a 34 y.o. male.   Chief Complaint   Patient presents with    Hypertension    Hyperlipidemia    Diabetes     Hypertension  This is a chronic problem. The current episode started more than 1 year ago. The problem is unchanged. The problem is controlled. Pertinent negatives include no chest pain, palpitations or shortness of breath.   Hyperlipidemia  This is a chronic problem. The current episode started more than 1 year ago. The problem is controlled. Recent lipid tests were reviewed and are high. Exacerbating diseases include diabetes and obesity. Factors aggravating his hyperlipidemia include fatty foods. Pertinent negatives include no chest pain, myalgias or shortness of breath. The current treatment provides no improvement of lipids.   Diabetes  He presents for his follow-up diabetic visit. He has type 2 diabetes mellitus. His disease course has been improving. Pertinent negatives for diabetes include no chest pain, no fatigue, no polyphagia, no polyuria and no weight loss.        The following portions of the patient's history were reviewed and updated as appropriate: allergies, current medications, past family history, past medical history, past social history, past surgical history, and problem list.    Past Medical History:   Diagnosis Date    Hyperlipidemia     Hypertension        Past Surgical History:   Procedure Laterality Date    EXCISION MASS LEG  2009    AVM at [facility]     SPINE SURGERY  2013    Lumbar; Dr. Bell    TONSILLECTOMY  07/30/1999    Dr. Hyman        Family History   Problem Relation Age of Onset    Diabetes Father     Hyperlipidemia Father     Hypertension Father     Cancer Maternal Grandmother         Pancreatic at 82    Heart disease Maternal Grandfather     Cancer Paternal Grandfather         Pancreatic at 73        Social History     Socioeconomic History    Marital status: Single   Tobacco Use    Smoking status: Never    Smokeless tobacco: Current      "Types: Chew    Tobacco comments:     1 can every 2 weeks   Vaping Use    Vaping status: Never Used   Substance and Sexual Activity    Alcohol use: Yes     Alcohol/week: 15.0 standard drinks of alcohol     Types: 12 Cans of beer, 3 Standard drinks or equivalent per week    Drug use: Never    Sexual activity: Yes     Partners: Female     Birth control/protection: Condom, Pill       Outpatient Medications Prior to Visit   Medication Sig Dispense Refill    citalopram (CeleXA) 10 MG tablet Take 1 tablet by mouth Daily. 90 tablet 3    dapagliflozin Propanediol (Farxiga) 10 MG tablet Take 10 mg by mouth Daily. 90 tablet 3    lisinopril (PRINIVIL,ZESTRIL) 10 MG tablet Take 1 tablet by mouth Daily. 90 tablet 3    metFORMIN (GLUCOPHAGE) 500 MG tablet Take 1 tablet by mouth 2 (Two) Times a Day With Meals. 180 tablet 3    glipizide (glipiZIDE XL) 5 MG ER tablet Take 1 tablet by mouth Daily. (Patient not taking: Reported on 1/14/2025) 90 tablet 3    lovastatin (MEVACOR) 10 MG tablet Take 1 tablet by mouth Every Night. (Patient not taking: Reported on 1/14/2025) 30 tablet 0     No facility-administered medications prior to visit.        Review of Systems   Constitutional:  Negative for fatigue, unexpected weight gain and unexpected weight loss.   Eyes:  Negative for visual disturbance.   Respiratory:  Negative for shortness of breath.    Cardiovascular:  Negative for chest pain, palpitations and leg swelling.   Gastrointestinal:  Negative for nausea.   Endocrine: Negative for polyphagia and polyuria.   Genitourinary:  Negative for frequency.   Musculoskeletal:  Negative for myalgias.   Skin:  Negative for dry skin and skin lesions.   Neurological:  Negative for syncope, numbness and headache.       Objective   Visit Vitals  /87 (BP Location: Left arm, Patient Position: Sitting, Cuff Size: Large Adult)   Pulse 89   Temp 96.9 °F (36.1 °C) (Temporal)   Resp 18   Ht 190.5 cm (75\")   Wt (!) 151 kg (333 lb 12.8 oz)   SpO2 98% "   BMI 41.72 kg/m²     Physical Exam  Vitals and nursing note reviewed.   Constitutional:       Appearance: He is well-developed.   HENT:      Head: Normocephalic.   Neck:      Thyroid: No thyromegaly.      Vascular: No carotid bruit.      Trachea: Trachea normal.   Cardiovascular:      Rate and Rhythm: Normal rate and regular rhythm.      Heart sounds: No murmur heard.     No friction rub. No gallop.   Pulmonary:      Effort: Pulmonary effort is normal. No respiratory distress.      Breath sounds: Normal breath sounds. No wheezing.   Chest:      Chest wall: No tenderness.   Musculoskeletal:      Cervical back: Neck supple.   Skin:     General: Skin is dry.      Findings: No rash.      Nails: There is no clubbing.   Neurological:      Mental Status: He is alert and oriented to person, place, and time.   Psychiatric:         Behavior: Behavior is cooperative.         Assessment & Plan   Problem List Items Addressed This Visit          High    Uncontrolled type 2 diabetes mellitus with hyperglycemia - Primary    Overview     Diabetic foot exam done today and WNL.  Patient has promised to walk for 30 minutes daily.    12- dx  Farxiga is not covered by insurance and cost pt. $800 per month which he has been paying cash    MAX DOSE Wegovy 2.4 ml weekly  MAX DOSE Farxiga 25 mg daily.         Current Assessment & Plan     A1c done 1-, read by me, reviewed with pt. A1c is down to 9.1 from 9.5 and is still uncontrolled  Improved.  Not at goal.  Restart Glipizide 5 mg daily..  Encouraged to watch sugar intake, exercise more and lose weight.   Non-compliant with medication, pt. Was taking wrong dose of Farxiga and will increase to Farxiga 10 mg.   Monitoring sugar at home.   Follow up in 1 months  Care management needs are self-addressed. Self-management abilities addressed and patient is capable of managing his own disease.                   Relevant Medications    glipizide (glipiZIDE XL) 5 MG ER tablet     dapagliflozin Propanediol (Farxiga) 10 MG tablet    metFORMIN (GLUCOPHAGE) 500 MG tablet    Other Relevant Orders    Microalbumin / Creatinine Urine Ratio - Urine, Clean Catch    Hemoglobin A1c       Medium    Abnormal liver enzymes    Overview     Patient has decreased alcohol intake.         Current Assessment & Plan     Improved.  CMP done 1-, read by me, reviewed with pt.         Anxiety    Current Assessment & Plan     Doing well.  He tolerated Celexa well without side effects.  Benefits outweigh the risk         Relevant Medications    citalopram (CeleXA) 10 MG tablet    Diabetic nephropathy    Current Assessment & Plan     Will order labs today and patient will return for results and shared decision making.           Relevant Medications    glipizide (glipiZIDE XL) 5 MG ER tablet    dapagliflozin Propanediol (Farxiga) 10 MG tablet    metFORMIN (GLUCOPHAGE) 500 MG tablet    Hypertension    Current Assessment & Plan     Doing well.  He tolerates lisinopril well without side effects.  Benefits outweigh the risk  Encouraged to watch salt, exercise more and lose weight.           Relevant Medications    lisinopril (PRINIVIL,ZESTRIL) 10 MG tablet    Mixed hyperlipidemia    Current Assessment & Plan     Lipid and CMP done 1-, read by me, reviewed with pt.  Worsening.   Increase lovastatin to 20 mg daily.  Encouraged to watch fatty intake, exercise more, and lose weight.   No medication due to stopping statin due to elevated liver functions.    Is not getting adequate diet and exercise  Goals developed at last visit were not met   Follow up in 2-3  months  Care management needs are self-addressed. Self-management abilities addressed and patient is capable of managing his own disease.           Relevant Medications    lovastatin (MEVACOR) 20 MG tablet    Other Relevant Orders    Lipid Panel With / Chol / HDL Ratio    Comprehensive Metabolic Panel       Low    Class 3 severe obesity due to excess calories  with serious comorbidity and body mass index (BMI) of 40.0 to 44.9 in adult    Overview     Diet exercise and wt loss encouraged consequences of obesity discussed          Current Assessment & Plan     Patient's (Body mass index is 41.72 kg/m².) indicates that they are morbidly/severely obese (BMI > 40 or > 35 with obesity - related health condition) with health conditions that include hypertension, diabetes mellitus, and dyslipidemias . Weight is improving with lifestyle modifications. BMI  is above average; BMI management plan is completed. We discussed portion control and increasing exercise.          RESOLVED: Hyperkalemia    Current Assessment & Plan     Resolved x 3, thus delete.  CMP done 1-, read by me, reviewed with pt.  Potassium normal x 3.            Unprioritized    Polycythemia    Current Assessment & Plan     Stable.  CBC done 1-, read by me, reviewed with pt.         Relevant Orders    CBC & Differential    Carbon Monoxide, Blood    Situational stress    Current Assessment & Plan     Stable         Relevant Medications    citalopram (CeleXA) 10 MG tablet

## 2025-02-10 NOTE — PROGRESS NOTES
Medical Examination      Subjective     Bobby Rodriguez is a 34 y.o. male who presents today for a  fitness determination physical exam. The patient reports no problems.  The following portions of the patient's history were reviewed and updated as appropriate: allergies, current medications, past family history, past medical history, past social history, past surgical history, and problem list.    I personally reviewed and updated the patient's allergies, medications, problem list, and past medical, surgical, social, and family history. I have reviewed and confirmed the accuracy of the History of Present Illness and Review of Symptoms as documented by the MA/LPN/RN. Willian Andrea MD        Family History   Problem Relation Age of Onset    Diabetes Father     Hyperlipidemia Father     Hypertension Father     Cancer Maternal Grandmother         Pancreatic at 82    Heart disease Maternal Grandfather     Cancer Paternal Grandfather         Pancreatic at 73       Social History     Tobacco Use    Smoking status: Never    Smokeless tobacco: Current     Types: Chew    Tobacco comments:     1 can every 2 weeks   Vaping Use    Vaping status: Never Used   Substance Use Topics    Alcohol use: Yes     Alcohol/week: 15.0 standard drinks of alcohol     Types: 12 Cans of beer, 3 Standard drinks or equivalent per week    Drug use: Never       Past Surgical History:   Procedure Laterality Date    EXCISION MASS LEG  2009    AVM at [facility]     SPINE SURGERY  2013    Lumbar; Dr. Bell    TONSILLECTOMY  07/30/1999    Dr. Hyman       Patient Active Problem List   Diagnosis    Situational stress    Mixed hyperlipidemia    Lethargy    Abnormal liver enzymes    Anxiety    Displacement of lumbar intervertebral disc without myelopathy    Class 3 severe obesity due to excess calories with serious comorbidity and body mass index (BMI) of 40.0 to 44.9 in adult    Schmorl's nodes-lumbar region    Sleep walking     Hypertension    Encounter for general adult medical examination with abnormal findings    Snoring    Uncontrolled type 2 diabetes mellitus with hyperglycemia    Nocturia    Immunization counseling    Paresthesia    Skin tags, multiple acquired    Hypoglycemia    Myalgia    Epistaxis    Diabetic nephropathy    Polycythemia         Current Outpatient Medications:     citalopram (CeleXA) 10 MG tablet, Take 1 tablet by mouth Daily., Disp: , Rfl:     dapagliflozin Propanediol (Farxiga) 10 MG tablet, Take 10 mg by mouth Daily., Disp: , Rfl:     glipizide (glipiZIDE XL) 5 MG ER tablet, Take 1 tablet by mouth Daily., Disp: 30 tablet, Rfl: 5    lisinopril (PRINIVIL,ZESTRIL) 10 MG tablet, Take 1 tablet by mouth Daily., Disp: , Rfl:     lovastatin (MEVACOR) 20 MG tablet, Take 1 tablet by mouth Every Night., Disp: 30 tablet, Rfl: 5    metFORMIN (GLUCOPHAGE) 500 MG tablet, Take 1 tablet by mouth 2 (Two) Times a Day With Meals., Disp: , Rfl:          Review of Systems   Constitutional:  Negative for chills and diaphoresis.   HENT:  Negative for trouble swallowing and voice change.    Eyes:  Negative for visual disturbance.   Respiratory:  Negative for shortness of breath.    Cardiovascular:  Negative for chest pain and palpitations.   Gastrointestinal:  Negative for abdominal pain and nausea.   Endocrine: Negative for polydipsia and polyphagia.   Genitourinary:  Negative for hematuria.   Musculoskeletal:  Negative for neck stiffness.   Skin:  Negative for color change and pallor.   Allergic/Immunologic: Negative for immunocompromised state.   Neurological:  Negative for seizures and syncope.   Hematological:  Negative for adenopathy.   Psychiatric/Behavioral:  Negative for hallucinations, sleep disturbance and suicidal ideas.        I have reviewed and confirmed the accuracy of the ROS as documented by the MA/LPN/RN Willian Andrea MD      Objective   /82 (BP Location: Right arm, Patient Position: Sitting, Cuff Size:  "Large Adult)   Pulse 86   Temp 98 °F (36.7 °C) (Temporal)   Resp 18   Ht 190.5 cm (75\")   Wt (!) 151 kg (332 lb 6.4 oz)   SpO2 97%   BMI 41.55 kg/m²   Wt Readings from Last 3 Encounters:   02/11/25 (!) 151 kg (332 lb 6.4 oz)   01/14/25 (!) 151 kg (333 lb 12.8 oz)   12/09/24 (!) 152 kg (334 lb 12.8 oz)       Vision:   Uncorrected Corrected Horizontal Field of Vision   Right Eye 20/30  >85 degrees   Left Eye  20/30  >85 degrees   Both Eyes  20/15       Applicant can recognize and distinguish among traffic control signals and devices showing standard red, green, and lebron colors.         Monocular Vision?: No    Physical Exam  Constitutional:       Appearance: Normal appearance. He is well-developed. He is not diaphoretic.   HENT:      Head: Normocephalic and atraumatic.      Right Ear: Tympanic membrane, ear canal and external ear normal.      Left Ear: Tympanic membrane, ear canal and external ear normal.      Nose: Nose normal.      Mouth/Throat:      Mouth: Mucous membranes are moist.   Eyes:      General: Lids are normal. No scleral icterus.        Right eye: No foreign body or discharge.         Left eye: No foreign body or discharge.      Extraocular Movements: Extraocular movements intact.      Right eye: No nystagmus.      Left eye: No nystagmus.      Conjunctiva/sclera: Conjunctivae normal.      Right eye: Right conjunctiva is not injected. No exudate or hemorrhage.     Left eye: Left conjunctiva is not injected. No exudate or hemorrhage.     Pupils: Pupils are equal, round, and reactive to light.      Funduscopic exam:     Right eye: No hemorrhage, exudate, AV nicking, arteriolar narrowing or papilledema.         Left eye: No hemorrhage, exudate, AV nicking, arteriolar narrowing or papilledema.   Neck:      Thyroid: No thyromegaly.      Vascular: No carotid bruit or JVD.      Trachea: No tracheal deviation.   Cardiovascular:      Rate and Rhythm: Normal rate and regular rhythm.      Heart sounds: " Normal heart sounds. No murmur heard.     No friction rub. No gallop.   Pulmonary:      Effort: Pulmonary effort is normal.      Breath sounds: Normal breath sounds. No stridor. No decreased breath sounds, wheezing or rales.   Abdominal:      General: Bowel sounds are normal. There is no distension.      Palpations: Abdomen is soft. There is no mass.      Tenderness: There is no abdominal tenderness. There is no guarding or rebound.      Hernia: No hernia is present. There is no hernia in the left inguinal area.   Genitourinary:     Penis: Normal.       Testes: Normal.         Right: Mass, tenderness or swelling not present.         Left: Mass, tenderness or swelling not present.   Lymphadenopathy:      Head:      Right side of head: No submental, submandibular, tonsillar, preauricular, posterior auricular or occipital adenopathy.      Left side of head: No submental, submandibular, tonsillar, preauricular, posterior auricular or occipital adenopathy.      Cervical: No cervical adenopathy.      Lower Body: No right inguinal adenopathy. No left inguinal adenopathy.   Skin:     General: Skin is warm and dry.      Coloration: Skin is not pale.   Neurological:      Mental Status: He is alert and oriented to person, place, and time.      Cranial Nerves: No cranial nerve deficit.      Sensory: No sensory deficit.      Coordination: Coordination normal.      Gait: Gait normal.      Deep Tendon Reflexes: Reflexes are normal and symmetric.           Assessment & Plan      Medications        Problem List         LOS    CDL physical.  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  No sign or symptoms sleep apnea currently.  Type 2 diabetes.  He reports improved control and home blood sugars in the last few weeks, medication has been adjusted.  Last A1c to 9.1.        Diagnoses and all orders for this visit:    1. 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 Class  3 severe obesity due to excess calories with serious comorbidity and body mass index (BMI) of 40.0 to 44.9 in adult      Class 3 Severe Obesity (BMI >=40). Obesity-related health conditions include the following: hypertension. Obesity is unchanged. BMI is is above average; BMI management plan is completed. We discussed portion control and increasing exercise.          Expected course, medications, and adverse effects discussed.  Call or return if worsening or persistent symptoms.  I wore protective equipment throughout this patient encounter including a mask, gloves, and eye protection.  Hand hygiene was performed before donning protective equipment and after removal when leaving the room.  The complete contents of the assessment and plan and lab results as documented above have been reviewed and addressed by myself with the patient today as part of an ongoing evaluation / treatment plan.  If some of the documentation has been copied from a previous note and is unchanged it indicates that this problem / plan has been assessed today but is stable from a previous visit and no changes have been recommended.

## 2025-02-11 ENCOUNTER — CLINICAL SUPPORT (OUTPATIENT)
Dept: FAMILY MEDICINE CLINIC | Facility: CLINIC | Age: 35
End: 2025-02-11

## 2025-02-11 VITALS
SYSTOLIC BLOOD PRESSURE: 128 MMHG | HEIGHT: 75 IN | TEMPERATURE: 98 F | RESPIRATION RATE: 18 BRPM | HEART RATE: 86 BPM | DIASTOLIC BLOOD PRESSURE: 82 MMHG | WEIGHT: 315 LBS | OXYGEN SATURATION: 97 % | BODY MASS INDEX: 39.17 KG/M2

## 2025-02-11 DIAGNOSIS — E66.813 CLASS 3 SEVERE OBESITY DUE TO EXCESS CALORIES WITH SERIOUS COMORBIDITY AND BODY MASS INDEX (BMI) OF 40.0 TO 44.9 IN ADULT: ICD-10-CM

## 2025-02-11 DIAGNOSIS — E66.01 CLASS 3 SEVERE OBESITY DUE TO EXCESS CALORIES WITH SERIOUS COMORBIDITY AND BODY MASS INDEX (BMI) OF 40.0 TO 44.9 IN ADULT: ICD-10-CM

## 2025-02-11 DIAGNOSIS — Z02.4 ENCOUNTER FOR CDL (COMMERCIAL DRIVING LICENSE) EXAM: Primary | ICD-10-CM

## 2025-02-11 LAB
BILIRUB BLD-MCNC: NEGATIVE MG/DL
CLARITY, POC: CLEAR
COLOR UR: YELLOW
GLUCOSE UR STRIP-MCNC: ABNORMAL MG/DL
KETONES UR QL: NEGATIVE
LEUKOCYTE EST, POC: NEGATIVE
NITRITE UR-MCNC: NEGATIVE MG/ML
PH UR: 6 [PH] (ref 5–8)
PROT UR STRIP-MCNC: NEGATIVE MG/DL
RBC # UR STRIP: NEGATIVE /UL
SP GR UR: 1.01 (ref 1–1.03)
UROBILINOGEN UR QL: NORMAL

## 2025-02-11 PROCEDURE — 81002 URINALYSIS NONAUTO W/O SCOPE: CPT | Performed by: FAMILY MEDICINE

## 2025-02-26 ENCOUNTER — OFFICE VISIT (OUTPATIENT)
Dept: FAMILY MEDICINE CLINIC | Facility: CLINIC | Age: 35
End: 2025-02-26
Payer: COMMERCIAL

## 2025-02-26 VITALS
WEIGHT: 315 LBS | SYSTOLIC BLOOD PRESSURE: 126 MMHG | HEIGHT: 74 IN | HEART RATE: 106 BPM | BODY MASS INDEX: 40.43 KG/M2 | DIASTOLIC BLOOD PRESSURE: 82 MMHG | RESPIRATION RATE: 18 BRPM | TEMPERATURE: 96.8 F | OXYGEN SATURATION: 97 %

## 2025-02-26 DIAGNOSIS — E78.2 MIXED HYPERLIPIDEMIA: ICD-10-CM

## 2025-02-26 DIAGNOSIS — I10 PRIMARY HYPERTENSION: ICD-10-CM

## 2025-02-26 DIAGNOSIS — E11.65 UNCONTROLLED TYPE 2 DIABETES MELLITUS WITH HYPERGLYCEMIA: Primary | ICD-10-CM

## 2025-02-26 DIAGNOSIS — E66.01 CLASS 3 SEVERE OBESITY DUE TO EXCESS CALORIES WITH SERIOUS COMORBIDITY AND BODY MASS INDEX (BMI) OF 40.0 TO 44.9 IN ADULT: ICD-10-CM

## 2025-02-26 DIAGNOSIS — D75.1 POLYCYTHEMIA: ICD-10-CM

## 2025-02-26 DIAGNOSIS — F43.9 SITUATIONAL STRESS: ICD-10-CM

## 2025-02-26 DIAGNOSIS — E66.813 CLASS 3 SEVERE OBESITY DUE TO EXCESS CALORIES WITH SERIOUS COMORBIDITY AND BODY MASS INDEX (BMI) OF 40.0 TO 44.9 IN ADULT: ICD-10-CM

## 2025-02-26 DIAGNOSIS — F41.9 ANXIETY: ICD-10-CM

## 2025-02-26 PROCEDURE — 99214 OFFICE O/P EST MOD 30 MIN: CPT | Performed by: FAMILY MEDICINE

## 2025-02-26 RX ORDER — GLIPIZIDE 5 MG/1
5 TABLET, FILM COATED, EXTENDED RELEASE ORAL DAILY
Qty: 90 TABLET | Refills: 3 | Status: SHIPPED | OUTPATIENT
Start: 2025-02-26

## 2025-02-26 RX ORDER — DAPAGLIFLOZIN 10 MG/1
10 TABLET, FILM COATED ORAL DAILY
Qty: 90 TABLET | Refills: 3 | Status: SHIPPED | OUTPATIENT
Start: 2025-02-26

## 2025-02-26 RX ORDER — CITALOPRAM HYDROBROMIDE 10 MG/1
10 TABLET ORAL DAILY
Qty: 90 TABLET | Refills: 3 | Status: SHIPPED | OUTPATIENT
Start: 2025-02-26

## 2025-02-26 RX ORDER — LOVASTATIN 20 MG/1
20 TABLET ORAL NIGHTLY
Qty: 90 TABLET | Refills: 3 | Status: SHIPPED | OUTPATIENT
Start: 2025-02-26

## 2025-02-26 RX ORDER — LISINOPRIL 10 MG/1
10 TABLET ORAL DAILY
Qty: 90 TABLET | Refills: 3 | Status: SHIPPED | OUTPATIENT
Start: 2025-02-26

## 2025-02-26 NOTE — ASSESSMENT & PLAN NOTE
Improving.  Home blood sugars reviewed and scanned to the chart.  Advised repeat A1c mid March and follow up. Patient tolerated Farxiga 10 mg, Glipizide 5 mg and Metformin 500 mg BID well without side effects. I feel the benefits of the medication outweigh the risks.   Encouraged to watch sugar intake, exercise more, lose weight,  Patient has started walking on treadmill 30-40 minutes 4 x weekly.

## 2025-02-26 NOTE — ASSESSMENT & PLAN NOTE
Patient's (Body mass index is 42.55 kg/m².) indicates that they are morbidly/severely obese (BMI > 40 or > 35 with obesity - related health condition) with health conditions that include hypertension and diabetes mellitus . Weight is improving with lifestyle modifications. BMI  is above average; BMI management plan is completed. We discussed portion control and increasing exercise.

## 2025-02-26 NOTE — PROGRESS NOTES
Subjective   Bobby Rodriguez is a 34 y.o. male.   Chief Complaint   Patient presents with    Diabetes     Diabetes  He presents for his follow-up (Extra visit for uncontrolled DM) diabetic visit. He has type 2 diabetes mellitus. His disease course has been improving. Pertinent negatives for diabetes include no polyphagia and no polyuria.   Extra visit for uncontrolled diabetes    The following portions of the patient's history were reviewed and updated as appropriate: allergies, current medications, past family history, past medical history, past social history, past surgical history, and problem list.    Past Medical History:   Diagnosis Date    Hyperlipidemia     Hypertension        Past Surgical History:   Procedure Laterality Date    EXCISION MASS LEG  2009    AVM at [facility]     SPINE SURGERY  2013    [facility]; Dr. Bell    TONSILLECTOMY  07/30/1999    Dr. Hyman        Family History   Problem Relation Age of Onset    Diabetes Father     Hyperlipidemia Father     Hypertension Father     Cancer Maternal Grandmother         Pancreatic at 82    Heart disease Maternal Grandfather     Cancer Paternal Grandfather         Pancreatic at 73        Social History     Socioeconomic History    Marital status: Single   Tobacco Use    Smoking status: Never    Smokeless tobacco: Current     Types: Chew    Tobacco comments:     1 can every 2 weeks   Vaping Use    Vaping status: Never Used   Substance and Sexual Activity    Alcohol use: Yes     Alcohol/week: 15.0 standard drinks of alcohol     Types: 12 Cans of beer, 3 Standard drinks or equivalent per week    Drug use: Never    Sexual activity: Yes     Partners: Female     Birth control/protection: Condom, Pill       Outpatient Medications Prior to Visit   Medication Sig Dispense Refill    citalopram (CeleXA) 10 MG tablet Take 1 tablet by mouth Daily.      dapagliflozin Propanediol (Farxiga) 10 MG tablet Take 10 mg by mouth Daily.      glipizide (glipiZIDE XL) 5 MG ER tablet Take  "1 tablet by mouth Daily. 30 tablet 5    lisinopril (PRINIVIL,ZESTRIL) 10 MG tablet Take 1 tablet by mouth Daily.      lovastatin (MEVACOR) 20 MG tablet Take 1 tablet by mouth Every Night. 30 tablet 5    metFORMIN (GLUCOPHAGE) 500 MG tablet Take 1 tablet by mouth 2 (Two) Times a Day With Meals.       No facility-administered medications prior to visit.        Review of Systems   Eyes:  Negative for visual disturbance.   Endocrine: Negative for polyphagia and polyuria.   Genitourinary:  Negative for frequency.   Skin:  Negative for skin lesions.   Neurological:  Negative for syncope and numbness.       Objective   Visit Vitals  /82 (BP Location: Left arm, Patient Position: Sitting, Cuff Size: Large Adult)   Pulse 106   Temp 96.8 °F (36 °C) (Temporal)   Resp 18   Ht 188 cm (74\")   Wt (!) 150 kg (331 lb 6.4 oz)   SpO2 97%   BMI 42.55 kg/m²     Physical Exam  Vitals and nursing note reviewed.   Constitutional:       Appearance: He is well-developed.   HENT:      Head: Normocephalic.   Neck:      Thyroid: No thyromegaly.      Vascular: No carotid bruit.      Trachea: Trachea normal.   Cardiovascular:      Rate and Rhythm: Normal rate and regular rhythm.      Heart sounds: No murmur heard.     No friction rub. No gallop.   Pulmonary:      Effort: Pulmonary effort is normal. No respiratory distress.      Breath sounds: Normal breath sounds. No wheezing.   Chest:      Chest wall: No tenderness.   Musculoskeletal:      Cervical back: Neck supple.   Skin:     General: Skin is dry.      Findings: No rash.      Nails: There is no clubbing.   Neurological:      Mental Status: He is alert and oriented to person, place, and time.   Psychiatric:         Behavior: Behavior is cooperative.         Assessment & Plan   Problem List Items Addressed This Visit          High    Uncontrolled type 2 diabetes mellitus with hyperglycemia - Primary    Overview     Diabetic foot exam done today and WNL.  Patient has promised to walk for 30 " minutes daily.    12- dx  Farxiga is not covered by insurance and cost pt. $800 per month which he has been paying cash    MAX DOSE Wegovy 2.4 ml weekly  MAX DOSE Farxiga 25 mg daily.         Current Assessment & Plan     Improving.  Home blood sugars reviewed and scanned to the chart.  Advised repeat A1c mid March and follow up. Patient tolerated Farxiga 10 mg, Glipizide 5 mg and Metformin 500 mg BID well without side effects. I feel the benefits of the medication outweigh the risks.   Encouraged to watch sugar intake, exercise more, lose weight,  Patient has started walking on treadmill 30-40 minutes 4 x weekly.           Relevant Medications    glipizide (glipiZIDE XL) 5 MG ER tablet    metFORMIN (GLUCOPHAGE) 500 MG tablet    dapagliflozin Propanediol (Farxiga) 10 MG tablet    Other Relevant Orders    Hemoglobin A1c       Medium    Anxiety    Current Assessment & Plan     Doing well with Celexa this is refilled         Relevant Medications    citalopram (CeleXA) 10 MG tablet    Hypertension    Current Assessment & Plan     Doing well with lisinopril thus this is refilled          Relevant Medications    lisinopril (PRINIVIL,ZESTRIL) 10 MG tablet    Mixed hyperlipidemia    Current Assessment & Plan     Will order labs today and patient will return for results and shared decision making.            Relevant Medications    lovastatin (MEVACOR) 20 MG tablet    Other Relevant Orders    Lipid Panel With / Chol / HDL Ratio    Comprehensive Metabolic Panel       Low    Class 3 severe obesity due to excess calories with serious comorbidity and body mass index (BMI) of 40.0 to 44.9 in adult    Overview     Diet exercise and wt loss encouraged consequences of obesity discussed          Current Assessment & Plan     Patient's (Body mass index is 42.55 kg/m².) indicates that they are morbidly/severely obese (BMI > 40 or > 35 with obesity - related health condition) with health conditions that include hypertension and  diabetes mellitus . Weight is improving with lifestyle modifications. BMI  is above average; BMI management plan is completed. We discussed portion control and increasing exercise.             Unprioritized    Polycythemia    Current Assessment & Plan     Will order labs today and patient will return for results and shared decision making.           Relevant Orders    CBC & Differential    Situational stress    Current Assessment & Plan     Doing well with Celexa--Rx refilled         Relevant Medications    citalopram (CeleXA) 10 MG tablet

## 2025-03-15 LAB
ALBUMIN SERPL-MCNC: 4.6 G/DL (ref 4.1–5.1)
ALP SERPL-CCNC: 80 IU/L (ref 44–121)
ALT SERPL-CCNC: 83 IU/L (ref 0–44)
AST SERPL-CCNC: 44 IU/L (ref 0–40)
BASOPHILS # BLD AUTO: 0.1 X10E3/UL (ref 0–0.2)
BASOPHILS NFR BLD AUTO: 1 %
BILIRUB SERPL-MCNC: 0.4 MG/DL (ref 0–1.2)
BUN SERPL-MCNC: 18 MG/DL (ref 6–20)
BUN/CREAT SERPL: 17 (ref 9–20)
CALCIUM SERPL-MCNC: 9.6 MG/DL (ref 8.7–10.2)
CHLORIDE SERPL-SCNC: 98 MMOL/L (ref 96–106)
CHOLEST SERPL-MCNC: 233 MG/DL (ref 100–199)
CHOLEST/HDLC SERPL: 8.6 RATIO (ref 0–5)
CO2 SERPL-SCNC: 24 MMOL/L (ref 20–29)
CREAT SERPL-MCNC: 1.08 MG/DL (ref 0.76–1.27)
EGFRCR SERPLBLD CKD-EPI 2021: 94 ML/MIN/1.73
EOSINOPHIL # BLD AUTO: 0.2 X10E3/UL (ref 0–0.4)
EOSINOPHIL NFR BLD AUTO: 3 %
ERYTHROCYTE [DISTWIDTH] IN BLOOD BY AUTOMATED COUNT: 14 % (ref 11.6–15.4)
GLOBULIN SER CALC-MCNC: 2.7 G/DL (ref 1.5–4.5)
GLUCOSE SERPL-MCNC: 156 MG/DL (ref 70–99)
HBA1C MFR BLD: 8.3 % (ref 4.8–5.6)
HCT VFR BLD AUTO: 53.6 % (ref 37.5–51)
HDLC SERPL-MCNC: 27 MG/DL
HGB BLD-MCNC: 17.7 G/DL (ref 13–17.7)
IMM GRANULOCYTES # BLD AUTO: 0.1 X10E3/UL (ref 0–0.1)
IMM GRANULOCYTES NFR BLD AUTO: 1 %
LDLC SERPL CALC-MCNC: 84 MG/DL (ref 0–99)
LYMPHOCYTES # BLD AUTO: 2.7 X10E3/UL (ref 0.7–3.1)
LYMPHOCYTES NFR BLD AUTO: 34 %
MCH RBC QN AUTO: 29.3 PG (ref 26.6–33)
MCHC RBC AUTO-ENTMCNC: 33 G/DL (ref 31.5–35.7)
MCV RBC AUTO: 89 FL (ref 79–97)
MONOCYTES # BLD AUTO: 0.7 X10E3/UL (ref 0.1–0.9)
MONOCYTES NFR BLD AUTO: 9 %
NEUTROPHILS # BLD AUTO: 4.3 X10E3/UL (ref 1.4–7)
NEUTROPHILS NFR BLD AUTO: 52 %
PLATELET # BLD AUTO: 216 X10E3/UL (ref 150–450)
POTASSIUM SERPL-SCNC: 4.2 MMOL/L (ref 3.5–5.2)
PROT SERPL-MCNC: 7.3 G/DL (ref 6–8.5)
RBC # BLD AUTO: 6.05 X10E6/UL (ref 4.14–5.8)
SODIUM SERPL-SCNC: 136 MMOL/L (ref 134–144)
TRIGL SERPL-MCNC: 750 MG/DL (ref 0–149)
VLDLC SERPL CALC-MCNC: 122 MG/DL (ref 5–40)
WBC # BLD AUTO: 8 X10E3/UL (ref 3.4–10.8)

## 2025-03-17 ENCOUNTER — OFFICE VISIT (OUTPATIENT)
Dept: FAMILY MEDICINE CLINIC | Facility: CLINIC | Age: 35
End: 2025-03-17
Payer: COMMERCIAL

## 2025-03-17 VITALS
SYSTOLIC BLOOD PRESSURE: 128 MMHG | BODY MASS INDEX: 40.43 KG/M2 | DIASTOLIC BLOOD PRESSURE: 82 MMHG | TEMPERATURE: 97.3 F | RESPIRATION RATE: 18 BRPM | HEART RATE: 101 BPM | HEIGHT: 74 IN | OXYGEN SATURATION: 96 % | WEIGHT: 315 LBS

## 2025-03-17 DIAGNOSIS — D75.1 POLYCYTHEMIA: ICD-10-CM

## 2025-03-17 DIAGNOSIS — E66.01 CLASS 3 SEVERE OBESITY DUE TO EXCESS CALORIES WITH SERIOUS COMORBIDITY AND BODY MASS INDEX (BMI) OF 40.0 TO 44.9 IN ADULT: ICD-10-CM

## 2025-03-17 DIAGNOSIS — R74.8 ABNORMAL LIVER ENZYMES: ICD-10-CM

## 2025-03-17 DIAGNOSIS — I10 PRIMARY HYPERTENSION: ICD-10-CM

## 2025-03-17 DIAGNOSIS — E11.65 UNCONTROLLED TYPE 2 DIABETES MELLITUS WITH HYPERGLYCEMIA: Primary | ICD-10-CM

## 2025-03-17 DIAGNOSIS — F43.9 SITUATIONAL STRESS: ICD-10-CM

## 2025-03-17 DIAGNOSIS — E66.813 CLASS 3 SEVERE OBESITY DUE TO EXCESS CALORIES WITH SERIOUS COMORBIDITY AND BODY MASS INDEX (BMI) OF 40.0 TO 44.9 IN ADULT: ICD-10-CM

## 2025-03-17 DIAGNOSIS — F41.9 ANXIETY: ICD-10-CM

## 2025-03-17 DIAGNOSIS — E78.2 MIXED HYPERLIPIDEMIA: ICD-10-CM

## 2025-03-17 PROCEDURE — 99214 OFFICE O/P EST MOD 30 MIN: CPT | Performed by: FAMILY MEDICINE

## 2025-03-17 RX ORDER — METFORMIN HYDROCHLORIDE 500 MG/1
500 TABLET, EXTENDED RELEASE ORAL 2 TIMES DAILY
Qty: 180 TABLET | Refills: 3 | Status: SHIPPED | OUTPATIENT
Start: 2025-03-17

## 2025-03-17 RX ORDER — LOVASTATIN 20 MG/1
20 TABLET ORAL NIGHTLY
Start: 2025-03-17

## 2025-03-17 RX ORDER — DAPAGLIFLOZIN 10 MG/1
10 TABLET, FILM COATED ORAL DAILY
Qty: 90 TABLET | Refills: 3 | Status: SHIPPED | OUTPATIENT
Start: 2025-03-17

## 2025-03-17 RX ORDER — CITALOPRAM HYDROBROMIDE 20 MG/1
20 TABLET ORAL DAILY
Qty: 90 TABLET | Refills: 2 | Status: SHIPPED | OUTPATIENT
Start: 2025-03-17

## 2025-03-17 RX ORDER — LISINOPRIL 10 MG/1
10 TABLET ORAL DAILY
Start: 2025-03-17

## 2025-03-17 NOTE — ASSESSMENT & PLAN NOTE
Stable, increase Celexa to 20 mg daily from 10 mg daily.  Patient tolerated Celexa well without side effects. I feel the benefits of the medication outweigh the risks.  Discussed counseling with patient, pt. Declines at present.

## 2025-03-17 NOTE — ASSESSMENT & PLAN NOTE
Lipid and CMP done 3-, read by me, reviewed with pt.  Trig. 750 up from 568, Tot. Chol. 233 down from 255, HDL 27 down from 30, LDL 84 down from 123  Slightly Worsening..   Encouraged to watch fatty intake, exercise more, and lose weight.   Compliant with medication.  Patient tolerated Lovastatin well without side effects. I feel the benefits of the medication outweigh the risks.    Is not getting adequate diet and exercise  Goals developed at last visit were not met   Follow up in  3 months  Care management needs are self-addressed.  Self-management abilities addressed and patient is capable of managing his own disease.

## 2025-03-17 NOTE — ASSESSMENT & PLAN NOTE
Improved.  CBC done 3-, read by me, reviewed with pt.  RBC was 6.05 down from 6.16, HCT was 53.6 down from 54.6

## 2025-03-17 NOTE — ASSESSMENT & PLAN NOTE
-A1c done 3-, read by me, reviewed with pt.  A1c was 8.3 down from 9.1  Improved.   Encouraged to watch sugar intake, exercise more and lose weight.   Compliant with medication.   Patient tolerated Farxiga and Metformin well without side effects. I feel the benefits of the medication outweigh the risks.   Not monitoring sugar at home.   Follow up in 3 months  Care management needs are self-addressed.  Self-management abilities addressed and patient is capable of managing his own disease.

## 2025-03-17 NOTE — ASSESSMENT & PLAN NOTE
Patient's (Body mass index is 42.81 kg/m².) indicates that they are morbidly/severely obese (BMI > 40 or > 35 with obesity - related health condition) with health conditions that include hypertension, diabetes mellitus, and dyslipidemias . Weight is worsening. BMI  is above average; BMI management plan is completed. We discussed portion control and increasing exercise.

## 2025-03-17 NOTE — ASSESSMENT & PLAN NOTE
Improving.  CMP done 3-, read by me, reviewed with pt.  AST was 44 down from 47, ALT was 83 down from 96

## 2025-03-17 NOTE — PROGRESS NOTES
Subjective   Bobby Rodriguez is a 34 y.o. male.   Chief Complaint   Patient presents with    Hypertension    Hyperlipidemia    Diabetes     Hypertension  This is a chronic problem. The current episode started more than 1 year ago. The problem is unchanged. The problem is controlled. Pertinent negatives include no chest pain, palpitations or shortness of breath.   Hyperlipidemia  This is a chronic problem. The current episode started more than 1 year ago. Recent lipid tests were reviewed and are high. Exacerbating diseases include diabetes and liver disease. Factors aggravating his hyperlipidemia include fatty foods. Pertinent negatives include no chest pain, myalgias or shortness of breath. Current antihyperlipidemic treatment includes statins. The current treatment provides no improvement of lipids.   Diabetes  He presents for his follow-up diabetic visit. He has type 2 diabetes mellitus. His disease course has been improving. Pertinent negatives for diabetes include no chest pain, no fatigue, no polyphagia, no polyuria and no weight loss.        The following portions of the patient's history were reviewed and updated as appropriate: allergies, current medications, past family history, past medical history, past social history, past surgical history, and problem list.    Past Medical History:   Diagnosis Date    Hyperlipidemia     Hypertension        Past Surgical History:   Procedure Laterality Date    EXCISION MASS LEG  2009    [facility]M at [facility]     SPINE SURGERY  2013    [facility]; Dr. Bell    TONSILLECTOMY  07/30/1999    Dr. Hyman        Family History   Problem Relation Age of Onset    Diabetes Father     Hyperlipidemia Father     Hypertension Father     Cancer Maternal Grandmother         Pancreatic at 82    Heart disease Maternal Grandfather     Cancer Paternal Grandfather         Pancreatic at 73        Social History     Socioeconomic History    Marital status: Single   Tobacco Use    Smoking status: Never     "Smokeless tobacco: Current     Types: Chew    Tobacco comments:     1 can every 2 weeks   Vaping Use    Vaping status: Never Used   Substance and Sexual Activity    Alcohol use: Yes     Alcohol/week: 15.0 standard drinks of alcohol     Types: 12 Cans of beer, 3 Standard drinks or equivalent per week    Drug use: Never    Sexual activity: Yes     Partners: Female     Birth control/protection: Condom, Pill       Outpatient Medications Prior to Visit   Medication Sig Dispense Refill    citalopram (CeleXA) 10 MG tablet Take 1 tablet by mouth Daily. 90 tablet 3    dapagliflozin Propanediol (Farxiga) 10 MG tablet Take 10 mg by mouth Daily. 90 tablet 3    lisinopril (PRINIVIL,ZESTRIL) 10 MG tablet Take 1 tablet by mouth Daily. 90 tablet 3    lovastatin (MEVACOR) 20 MG tablet Take 1 tablet by mouth Every Night. 90 tablet 3    metFORMIN (GLUCOPHAGE) 500 MG tablet Take 1 tablet by mouth 2 (Two) Times a Day With Meals. 180 tablet 3    glipizide (glipiZIDE XL) 5 MG ER tablet Take 1 tablet by mouth Daily. (Patient not taking: Reported on 3/17/2025) 90 tablet 3     No facility-administered medications prior to visit.        Review of Systems   Constitutional:  Negative for fatigue, unexpected weight gain and unexpected weight loss.   Eyes:  Negative for visual disturbance.   Respiratory:  Negative for shortness of breath.    Cardiovascular:  Negative for chest pain, palpitations and leg swelling.   Gastrointestinal:  Negative for nausea.   Endocrine: Negative for polyphagia and polyuria.   Genitourinary:  Negative for frequency.   Musculoskeletal:  Negative for myalgias.   Skin:  Negative for dry skin and skin lesions.   Neurological:  Negative for syncope, numbness and headache.       Objective   Visit Vitals  /82 (BP Location: Left arm, Patient Position: Sitting, Cuff Size: Large Adult)   Pulse 101   Temp 97.3 °F (36.3 °C) (Temporal)   Resp 18   Ht 188 cm (74\")   Wt (!) 151 kg (333 lb 6.4 oz)   SpO2 96%   BMI 42.81 kg/m² "     Physical Exam  Vitals and nursing note reviewed.   Constitutional:       Appearance: He is well-developed.   HENT:      Head: Normocephalic.   Neck:      Thyroid: No thyromegaly.      Vascular: No carotid bruit.      Trachea: Trachea normal.   Cardiovascular:      Rate and Rhythm: Normal rate and regular rhythm.      Heart sounds: No murmur heard.     No friction rub. No gallop.   Pulmonary:      Effort: Pulmonary effort is normal. No respiratory distress.      Breath sounds: Normal breath sounds. No wheezing.   Chest:      Chest wall: No tenderness.   Musculoskeletal:      Cervical back: Neck supple.   Skin:     General: Skin is dry.      Findings: No rash.      Nails: There is no clubbing.   Neurological:      Mental Status: He is alert and oriented to person, place, and time.   Psychiatric:         Behavior: Behavior is cooperative.         Assessment & Plan   Problem List Items Addressed This Visit          High    Uncontrolled type 2 diabetes mellitus with hyperglycemia - Primary    Overview   Diabetic foot exam done 3- and WNL.  Patient has promised to walk for 30 minutes daily.    12- dx  Farxiga is not covered by insurance and cost pt. $800 per month which he has been paying cash    MAX DOSE Wegovy 2.4 ml weekly  MAX DOSE Farxiga 25 mg daily.         Current Assessment & Plan   -A1c done 3-, read by me, reviewed with pt.  A1c was 8.3 down from 9.1  Improved.   Encouraged to watch sugar intake, exercise more and lose weight.   Compliant with medication.   Patient tolerated Farxiga and Metformin well without side effects. I feel the benefits of the medication outweigh the risks.   Not monitoring sugar at home.   Follow up in 3 months  Care management needs are self-addressed.  Self-management abilities addressed and patient is capable of managing his own disease.           Relevant Medications    dapagliflozin Propanediol (Farxiga) 10 MG tablet    metFORMIN ER (GLUCOPHAGE-XR) 500 MG 24  hr tablet    Other Relevant Orders    Hemoglobin A1c    Microalbumin / Creatinine Urine Ratio - Urine, Clean Catch       Medium    Abnormal liver enzymes    Overview   Patient has decreased alcohol intake.         Current Assessment & Plan   Improving.  CMP done 3-, read by me, reviewed with pt.  AST was 44 down from 47, ALT was 83 down from 96         Anxiety    Current Assessment & Plan   Stable, not at goal.  Advised to increase Celexa to 20 mg daily from 10 mg daily.  Patient tolerated Celexa well without side effects. I feel the benefits of the medication outweigh the risks.   Discussed counseling with patient, pt. Declines at present.         Relevant Medications    citalopram (CeleXA) 20 MG tablet    Hypertension    Current Assessment & Plan   Doing well. Patient tolerated Lisinopril well without side effects. I feel the benefits of the medication outweigh the risks.   Encouraged to watch salt, exercise more and lose weight.           Relevant Medications    lisinopril (PRINIVIL,ZESTRIL) 10 MG tablet    Mixed hyperlipidemia    Current Assessment & Plan   Lipid and CMP done 3-, read by me, reviewed with pt.  Trig. 750 up from 568, Tot. Chol. 233 down from 255, HDL 27 down from 30, LDL 84 down from 123  Slightly Worsening..   Encouraged to watch fatty intake, exercise more, and lose weight.   Compliant with medication.  Patient tolerated Lovastatin well without side effects. I feel the benefits of the medication outweigh the risks.    Is not getting adequate diet and exercise  Goals developed at last visit were not met   Follow up in  3 months  Care management needs are self-addressed.  Self-management abilities addressed and patient is capable of managing his own disease.           Relevant Medications    lovastatin (MEVACOR) 20 MG tablet    Other Relevant Orders    Lipid Panel With / Chol / HDL Ratio    Comprehensive Metabolic Panel       Low    Class 3 severe obesity due to excess calories with  serious comorbidity and body mass index (BMI) of 40.0 to 44.9 in adult    Overview   Diet exercise and wt loss encouraged consequences of obesity discussed          Current Assessment & Plan   Patient's (Body mass index is 42.81 kg/m².) indicates that they are morbidly/severely obese (BMI > 40 or > 35 with obesity - related health condition) with health conditions that include hypertension, diabetes mellitus, and dyslipidemias . Weight is worsening. BMI  is above average; BMI management plan is completed. We discussed portion control and increasing exercise.             Unprioritized    Polycythemia    Current Assessment & Plan   Improved.  CBC done 3-, read by me, reviewed with pt.  RBC was 6.05 down from 6.16, HCT was 53.6 down from 54.6         Relevant Orders    CBC & Differential    Situational stress    Current Assessment & Plan   Stable, increase Celexa to 20 mg daily from 10 mg daily.  Patient tolerated Celexa well without side effects. I feel the benefits of the medication outweigh the risks.  Discussed counseling with patient, pt. Declines at present.         Relevant Medications    citalopram (CeleXA) 20 MG tablet

## 2025-03-17 NOTE — ASSESSMENT & PLAN NOTE
Stable, not at goal.  Advised to increase Celexa to 20 mg daily from 10 mg daily.  Patient tolerated Celexa well without side effects. I feel the benefits of the medication outweigh the risks.   Discussed counseling with patient, pt. Declines at present.

## 2025-05-31 LAB
ALBUMIN SERPL-MCNC: 4.5 G/DL (ref 4.1–5.1)
ALBUMIN/CREAT UR: 22 MG/G CREAT (ref 0–29)
ALP SERPL-CCNC: 93 IU/L (ref 44–121)
ALT SERPL-CCNC: 103 IU/L (ref 0–44)
AST SERPL-CCNC: 62 IU/L (ref 0–40)
BASOPHILS # BLD AUTO: 0.1 X10E3/UL (ref 0–0.2)
BASOPHILS NFR BLD AUTO: 1 %
BILIRUB SERPL-MCNC: 0.5 MG/DL (ref 0–1.2)
BUN SERPL-MCNC: 17 MG/DL (ref 6–20)
BUN/CREAT SERPL: 16 (ref 9–20)
CALCIUM SERPL-MCNC: 9.3 MG/DL (ref 8.7–10.2)
CHLORIDE SERPL-SCNC: 99 MMOL/L (ref 96–106)
CHOLEST SERPL-MCNC: 265 MG/DL (ref 100–199)
CHOLEST/HDLC SERPL: 10.6 RATIO (ref 0–5)
CO2 SERPL-SCNC: 22 MMOL/L (ref 20–29)
CREAT SERPL-MCNC: 1.06 MG/DL (ref 0.76–1.27)
CREAT UR-MCNC: 165.6 MG/DL
EGFRCR SERPLBLD CKD-EPI 2021: 96 ML/MIN/1.73
EOSINOPHIL # BLD AUTO: 0.2 X10E3/UL (ref 0–0.4)
EOSINOPHIL NFR BLD AUTO: 2 %
ERYTHROCYTE [DISTWIDTH] IN BLOOD BY AUTOMATED COUNT: 13.7 % (ref 11.6–15.4)
GLOBULIN SER CALC-MCNC: 2.9 G/DL (ref 1.5–4.5)
GLUCOSE SERPL-MCNC: 240 MG/DL (ref 70–99)
HBA1C MFR BLD: 10 % (ref 4.8–5.6)
HCT VFR BLD AUTO: 51.2 % (ref 37.5–51)
HDLC SERPL-MCNC: 25 MG/DL
HGB BLD-MCNC: 16.3 G/DL (ref 13–17.7)
IMM GRANULOCYTES # BLD AUTO: 0.1 X10E3/UL (ref 0–0.1)
IMM GRANULOCYTES NFR BLD AUTO: 1 %
LDLC SERPL CALC-MCNC: 102 MG/DL (ref 0–99)
LYMPHOCYTES # BLD AUTO: 2.3 X10E3/UL (ref 0.7–3.1)
LYMPHOCYTES NFR BLD AUTO: 31 %
MCH RBC QN AUTO: 29.3 PG (ref 26.6–33)
MCHC RBC AUTO-ENTMCNC: 31.8 G/DL (ref 31.5–35.7)
MCV RBC AUTO: 92 FL (ref 79–97)
MICROALBUMIN UR-MCNC: 36.3 UG/ML
MONOCYTES # BLD AUTO: 0.7 X10E3/UL (ref 0.1–0.9)
MONOCYTES NFR BLD AUTO: 10 %
NEUTROPHILS # BLD AUTO: 4.1 X10E3/UL (ref 1.4–7)
NEUTROPHILS NFR BLD AUTO: 55 %
PLATELET # BLD AUTO: 202 X10E3/UL (ref 150–450)
POTASSIUM SERPL-SCNC: 4.6 MMOL/L (ref 3.5–5.2)
PROT SERPL-MCNC: 7.4 G/DL (ref 6–8.5)
RBC # BLD AUTO: 5.57 X10E6/UL (ref 4.14–5.8)
SODIUM SERPL-SCNC: 136 MMOL/L (ref 134–144)
TRIGL SERPL-MCNC: 794 MG/DL (ref 0–149)
VLDLC SERPL CALC-MCNC: 138 MG/DL (ref 5–40)
WBC # BLD AUTO: 7.5 X10E3/UL (ref 3.4–10.8)

## 2025-06-02 ENCOUNTER — OFFICE VISIT (OUTPATIENT)
Dept: FAMILY MEDICINE CLINIC | Facility: CLINIC | Age: 35
End: 2025-06-02
Payer: COMMERCIAL

## 2025-06-02 VITALS
TEMPERATURE: 96.6 F | OXYGEN SATURATION: 98 % | BODY MASS INDEX: 40.43 KG/M2 | SYSTOLIC BLOOD PRESSURE: 142 MMHG | WEIGHT: 315 LBS | HEIGHT: 74 IN | DIASTOLIC BLOOD PRESSURE: 98 MMHG | HEART RATE: 119 BPM | RESPIRATION RATE: 19 BRPM

## 2025-06-02 DIAGNOSIS — F41.9 ANXIETY: ICD-10-CM

## 2025-06-02 DIAGNOSIS — E66.01 CLASS 3 SEVERE OBESITY DUE TO EXCESS CALORIES WITH SERIOUS COMORBIDITY AND BODY MASS INDEX (BMI) OF 40.0 TO 44.9 IN ADULT: ICD-10-CM

## 2025-06-02 DIAGNOSIS — D75.1 POLYCYTHEMIA: ICD-10-CM

## 2025-06-02 DIAGNOSIS — E78.2 MIXED HYPERLIPIDEMIA: ICD-10-CM

## 2025-06-02 DIAGNOSIS — I10 PRIMARY HYPERTENSION: ICD-10-CM

## 2025-06-02 DIAGNOSIS — R74.8 ABNORMAL LIVER ENZYMES: ICD-10-CM

## 2025-06-02 DIAGNOSIS — E11.65 UNCONTROLLED TYPE 2 DIABETES MELLITUS WITH HYPERGLYCEMIA: Primary | ICD-10-CM

## 2025-06-02 DIAGNOSIS — E11.21 DIABETIC NEPHROPATHY ASSOCIATED WITH TYPE 2 DIABETES MELLITUS: ICD-10-CM

## 2025-06-02 DIAGNOSIS — E66.813 CLASS 3 SEVERE OBESITY DUE TO EXCESS CALORIES WITH SERIOUS COMORBIDITY AND BODY MASS INDEX (BMI) OF 40.0 TO 44.9 IN ADULT: ICD-10-CM

## 2025-06-02 PROCEDURE — 99214 OFFICE O/P EST MOD 30 MIN: CPT | Performed by: FAMILY MEDICINE

## 2025-06-02 RX ORDER — SEMAGLUTIDE 1.34 MG/ML
0.5 INJECTION, SOLUTION SUBCUTANEOUS WEEKLY
Qty: 1.5 ML | Refills: 5 | Status: SHIPPED | OUTPATIENT
Start: 2025-06-02

## 2025-06-02 RX ORDER — ACYCLOVIR 400 MG/1
1 TABLET ORAL DAILY
Qty: 1 EACH | Refills: 0 | Status: SHIPPED | OUTPATIENT
Start: 2025-06-02

## 2025-06-02 RX ORDER — ACYCLOVIR 400 MG/1
1 TABLET ORAL
Qty: 1 EACH | Refills: 2 | Status: SHIPPED | OUTPATIENT
Start: 2025-06-02

## 2025-06-02 NOTE — ASSESSMENT & PLAN NOTE
Worsening    Encouraged to watch fatty intake, exercise more, and lose weight.   compliant with medication  Patient tolerated lovastatin well without side effects. I feel the benefits of the medication outweigh the risks.    Is not getting adequate diet and exercise  Goals developed at last visit were not met   Follow up in 2  months  Care management needs are self-addressed.  Self-management abilities addressed and patient is capable of managing his own disease.

## 2025-06-02 NOTE — ASSESSMENT & PLAN NOTE
Worsening. Hgb A1c 10.0 up from 8.3  Encouraged to watch sugar intake, exercise more and lose weight.   compliant with medication. Patient tolerated glipizide, metformin Farixa well without side effects. I feel the benefits of the medication outweigh the risks.    Not monitoring sugar at home.  He will try and check blood sugars more frequently on his own and we will order a concha sensor  Follow up in 2 months  Care management needs are self-addressed.  Self-management abilities addressed and patient is capable of managing his own disease.

## 2025-06-04 NOTE — ASSESSMENT & PLAN NOTE
Patient's (Body mass index is 43.01 kg/m².) indicates that they are morbidly/severely obese (BMI > 40 or > 35 with obesity - related health condition) with health conditions that include diabetes mellitus . Weight is unchanged. BMI  is above average; BMI management plan is completed. We discussed low calorie, low carb based diet program, portion control, and increasing exercise.

## 2025-06-04 NOTE — ASSESSMENT & PLAN NOTE
Worsening control; Encouraged to watch salt, exercise more and lose weight.  Patient tolerated lisinopril well without side effects. I feel the benefits of the medication outweigh the risks.

## 2025-06-07 NOTE — ASSESSMENT & PLAN NOTE
Slightly worse due to stress at work.  He does tolerate the Celexa 20 fairly well.  Benefits outweigh the risk

## 2025-08-06 ENCOUNTER — OFFICE VISIT (OUTPATIENT)
Dept: FAMILY MEDICINE CLINIC | Facility: CLINIC | Age: 35
End: 2025-08-06
Payer: COMMERCIAL

## 2025-08-06 VITALS
WEIGHT: 315 LBS | HEIGHT: 74 IN | SYSTOLIC BLOOD PRESSURE: 136 MMHG | BODY MASS INDEX: 40.43 KG/M2 | DIASTOLIC BLOOD PRESSURE: 86 MMHG | TEMPERATURE: 96.9 F | RESPIRATION RATE: 18 BRPM | OXYGEN SATURATION: 96 % | HEART RATE: 103 BPM

## 2025-08-06 DIAGNOSIS — E87.1 HYPONATREMIA: ICD-10-CM

## 2025-08-06 DIAGNOSIS — E78.2 MIXED HYPERLIPIDEMIA: Primary | ICD-10-CM

## 2025-08-06 DIAGNOSIS — R74.8 ABNORMAL LIVER ENZYMES: ICD-10-CM

## 2025-08-06 DIAGNOSIS — I10 PRIMARY HYPERTENSION: ICD-10-CM

## 2025-08-06 DIAGNOSIS — E11.65 UNCONTROLLED TYPE 2 DIABETES MELLITUS WITH HYPERGLYCEMIA: ICD-10-CM

## 2025-08-06 DIAGNOSIS — E66.813 CLASS 3 SEVERE OBESITY DUE TO EXCESS CALORIES WITH SERIOUS COMORBIDITY AND BODY MASS INDEX (BMI) OF 40.0 TO 44.9 IN ADULT: ICD-10-CM

## 2025-08-06 LAB
ALBUMIN SERPL-MCNC: 4.5 G/DL (ref 4.1–5.1)
ALP SERPL-CCNC: 85 IU/L (ref 44–121)
ALT SERPL-CCNC: 81 IU/L (ref 0–44)
AST SERPL-CCNC: 48 IU/L (ref 0–40)
BILIRUB SERPL-MCNC: 0.7 MG/DL (ref 0–1.2)
BUN SERPL-MCNC: 14 MG/DL (ref 6–20)
BUN/CREAT SERPL: 14 (ref 9–20)
CALCIUM SERPL-MCNC: 9.6 MG/DL (ref 8.7–10.2)
CHLORIDE SERPL-SCNC: 95 MMOL/L (ref 96–106)
CHOLEST SERPL-MCNC: 240 MG/DL (ref 100–199)
CO2 SERPL-SCNC: 22 MMOL/L (ref 20–29)
CREAT SERPL-MCNC: 1 MG/DL (ref 0.76–1.27)
EGFRCR SERPLBLD CKD-EPI 2021: 103 ML/MIN/1.73
GLOBULIN SER CALC-MCNC: 2.7 G/DL (ref 1.5–4.5)
GLUCOSE SERPL-MCNC: 269 MG/DL (ref 70–99)
HBA1C MFR BLD: 10.2 % (ref 4.8–5.6)
HDLC SERPL-MCNC: 28 MG/DL
LDLC SERPL CALC-MCNC: 91 MG/DL (ref 0–99)
POTASSIUM SERPL-SCNC: 5.1 MMOL/L (ref 3.5–5.2)
PROT SERPL-MCNC: 7.2 G/DL (ref 6–8.5)
SODIUM SERPL-SCNC: 133 MMOL/L (ref 134–144)
TRIGL SERPL-MCNC: 724 MG/DL (ref 0–149)
VLDLC SERPL CALC-MCNC: 121 MG/DL (ref 5–40)

## 2025-08-06 PROCEDURE — 99214 OFFICE O/P EST MOD 30 MIN: CPT | Performed by: FAMILY MEDICINE

## 2025-08-06 RX ORDER — GLIPIZIDE 5 MG/1
5 TABLET, FILM COATED, EXTENDED RELEASE ORAL DAILY
Qty: 90 TABLET | Refills: 3 | Status: SHIPPED | OUTPATIENT
Start: 2025-08-06 | End: 2025-08-13 | Stop reason: SDUPTHER

## 2025-08-06 RX ORDER — LISINOPRIL 10 MG/1
10 TABLET ORAL DAILY
Start: 2025-08-06

## 2025-08-06 RX ORDER — DAPAGLIFLOZIN 10 MG/1
10 TABLET, FILM COATED ORAL DAILY
Qty: 90 TABLET | Refills: 3 | Status: SHIPPED | OUTPATIENT
Start: 2025-08-06 | End: 2025-08-13 | Stop reason: SDUPTHER

## 2025-08-06 RX ORDER — SEMAGLUTIDE 1.34 MG/ML
0.5 INJECTION, SOLUTION SUBCUTANEOUS WEEKLY
Qty: 6 ML | Refills: 12 | Status: SHIPPED | OUTPATIENT
Start: 2025-08-06 | End: 2025-08-13 | Stop reason: SDUPTHER

## 2025-08-06 RX ORDER — HYDROCHLOROTHIAZIDE 12.5 MG/1
CAPSULE ORAL DAILY
Qty: 9 EACH | Refills: 3 | Status: SHIPPED | OUTPATIENT
Start: 2025-08-06 | End: 2025-08-16

## 2025-08-06 RX ORDER — SEMAGLUTIDE 1.34 MG/ML
0.5 INJECTION, SOLUTION SUBCUTANEOUS WEEKLY
Qty: 6 ML | Refills: 12 | Status: SHIPPED | OUTPATIENT
Start: 2025-08-06 | End: 2025-08-06

## 2025-08-06 RX ORDER — LOVASTATIN 20 MG/1
20 TABLET ORAL NIGHTLY
Start: 2025-08-06

## 2025-08-13 ENCOUNTER — OFFICE VISIT (OUTPATIENT)
Dept: FAMILY MEDICINE CLINIC | Facility: CLINIC | Age: 35
End: 2025-08-13
Payer: COMMERCIAL

## 2025-08-13 VITALS
WEIGHT: 315 LBS | OXYGEN SATURATION: 95 % | RESPIRATION RATE: 18 BRPM | HEART RATE: 95 BPM | SYSTOLIC BLOOD PRESSURE: 132 MMHG | TEMPERATURE: 96.9 F | HEIGHT: 74 IN | BODY MASS INDEX: 40.43 KG/M2 | DIASTOLIC BLOOD PRESSURE: 82 MMHG

## 2025-08-13 DIAGNOSIS — E11.65 UNCONTROLLED TYPE 2 DIABETES MELLITUS WITH HYPERGLYCEMIA: ICD-10-CM

## 2025-08-13 DIAGNOSIS — E66.813 CLASS 3 SEVERE OBESITY DUE TO EXCESS CALORIES WITH SERIOUS COMORBIDITY AND BODY MASS INDEX (BMI) OF 40.0 TO 44.9 IN ADULT: Primary | ICD-10-CM

## 2025-08-13 PROCEDURE — 99213 OFFICE O/P EST LOW 20 MIN: CPT | Performed by: FAMILY MEDICINE

## 2025-08-13 RX ORDER — DAPAGLIFLOZIN 10 MG/1
10 TABLET, FILM COATED ORAL DAILY
Start: 2025-08-13

## 2025-08-13 RX ORDER — GLIPIZIDE 5 MG/1
5 TABLET, FILM COATED, EXTENDED RELEASE ORAL DAILY
Start: 2025-08-13

## 2025-08-13 RX ORDER — HYDROCHLOROTHIAZIDE 12.5 MG/1
CAPSULE ORAL CONTINUOUS
Qty: 1 EACH | Refills: 12 | Status: SHIPPED | OUTPATIENT
Start: 2025-08-13

## 2025-08-13 RX ORDER — SEMAGLUTIDE 1.34 MG/ML
0.5 INJECTION, SOLUTION SUBCUTANEOUS WEEKLY
Start: 2025-08-13

## 2025-08-27 ENCOUNTER — OFFICE VISIT (OUTPATIENT)
Dept: FAMILY MEDICINE CLINIC | Facility: CLINIC | Age: 35
End: 2025-08-27
Payer: COMMERCIAL

## 2025-08-27 VITALS
SYSTOLIC BLOOD PRESSURE: 122 MMHG | OXYGEN SATURATION: 97 % | TEMPERATURE: 96.9 F | HEIGHT: 74 IN | RESPIRATION RATE: 18 BRPM | WEIGHT: 315 LBS | HEART RATE: 102 BPM | DIASTOLIC BLOOD PRESSURE: 82 MMHG | BODY MASS INDEX: 40.43 KG/M2

## 2025-08-27 DIAGNOSIS — D75.1 POLYCYTHEMIA: ICD-10-CM

## 2025-08-27 DIAGNOSIS — E78.2 MIXED HYPERLIPIDEMIA: ICD-10-CM

## 2025-08-27 DIAGNOSIS — E16.2 HYPOGLYCEMIA: ICD-10-CM

## 2025-08-27 DIAGNOSIS — E11.65 UNCONTROLLED TYPE 2 DIABETES MELLITUS WITH HYPERGLYCEMIA: Primary | ICD-10-CM

## 2025-08-27 PROCEDURE — 99213 OFFICE O/P EST LOW 20 MIN: CPT | Performed by: FAMILY MEDICINE

## 2025-08-27 RX ORDER — GLIPIZIDE 5 MG/1
5 TABLET, FILM COATED, EXTENDED RELEASE ORAL DAILY
Start: 2025-08-27

## 2025-08-27 RX ORDER — DAPAGLIFLOZIN 10 MG/1
10 TABLET, FILM COATED ORAL DAILY
Start: 2025-08-27

## 2025-08-27 RX ORDER — SEMAGLUTIDE 1.34 MG/ML
1 INJECTION, SOLUTION SUBCUTANEOUS WEEKLY
Start: 2025-08-27

## 2025-08-27 RX ORDER — HYDROCHLOROTHIAZIDE 12.5 MG/1
CAPSULE ORAL CONTINUOUS
Start: 2025-08-27